# Patient Record
Sex: FEMALE | Race: BLACK OR AFRICAN AMERICAN | NOT HISPANIC OR LATINO | URBAN - METROPOLITAN AREA
[De-identification: names, ages, dates, MRNs, and addresses within clinical notes are randomized per-mention and may not be internally consistent; named-entity substitution may affect disease eponyms.]

---

## 2023-06-07 LAB
ABO GROUP (TYPE) IN BLOOD: NORMAL
ALANINE AMINOTRANSFERASE (SGPT) (U/L) IN SER/PLAS: 13 U/L (ref 7–45)
ALBUMIN (G/DL) IN SER/PLAS: 4.5 G/DL (ref 3.4–5)
ALKALINE PHOSPHATASE (U/L) IN SER/PLAS: 42 U/L (ref 33–110)
ANION GAP IN SER/PLAS: 10 MMOL/L (ref 10–20)
ANTIBODY SCREEN: NORMAL
ASPARTATE AMINOTRANSFERASE (SGOT) (U/L) IN SER/PLAS: 14 U/L (ref 9–39)
BILIRUBIN TOTAL (MG/DL) IN SER/PLAS: 0.4 MG/DL (ref 0–1.2)
CALCIUM (MG/DL) IN SER/PLAS: 10 MG/DL (ref 8.6–10.6)
CARBON DIOXIDE, TOTAL (MMOL/L) IN SER/PLAS: 30 MMOL/L (ref 21–32)
CHLORIDE (MMOL/L) IN SER/PLAS: 102 MMOL/L (ref 98–107)
CREATININE (MG/DL) IN SER/PLAS: 0.78 MG/DL (ref 0.5–1.05)
ERYTHROCYTE DISTRIBUTION WIDTH (RATIO) BY AUTOMATED COUNT: 11.9 % (ref 11.5–14.5)
ERYTHROCYTE MEAN CORPUSCULAR HEMOGLOBIN CONCENTRATION (G/DL) BY AUTOMATED: 33.6 G/DL (ref 32–36)
ERYTHROCYTE MEAN CORPUSCULAR VOLUME (FL) BY AUTOMATED COUNT: 96 FL (ref 80–100)
ERYTHROCYTES (10*6/UL) IN BLOOD BY AUTOMATED COUNT: 4.07 X10E12/L (ref 4–5.2)
GFR FEMALE: >90 ML/MIN/1.73M2
GLUCOSE (MG/DL) IN SER/PLAS: 72 MG/DL (ref 74–99)
HEMATOCRIT (%) IN BLOOD BY AUTOMATED COUNT: 39 % (ref 36–46)
HEMOGLOBIN (G/DL) IN BLOOD: 13.1 G/DL (ref 12–16)
HEPATITIS B VIRUS SURFACE AG PRESENCE IN SERUM: NONREACTIVE
HEPATITIS C VIRUS AB PRESENCE IN SERUM: NONREACTIVE
HIV 1/ 2 AG/AB SCREEN: NONREACTIVE
LACTATE DEHYDROGENASE (U/L) IN SER/PLAS BY LAC->PYR RXN: 121 U/L (ref 84–246)
LEUKOCYTES (10*3/UL) IN BLOOD BY AUTOMATED COUNT: 6.6 X10E9/L (ref 4.4–11.3)
NRBC (PER 100 WBCS) BY AUTOMATED COUNT: 0 /100 WBC (ref 0–0)
PLATELETS (10*3/UL) IN BLOOD AUTOMATED COUNT: 334 X10E9/L (ref 150–450)
POTASSIUM (MMOL/L) IN SER/PLAS: 4.1 MMOL/L (ref 3.5–5.3)
PROTEIN TOTAL: 7.5 G/DL (ref 6.4–8.2)
REFLEX ADDED, ANEMIA PANEL: NORMAL
RH FACTOR: NORMAL
RUBELLA VIRUS IGG AB: POSITIVE
SODIUM (MMOL/L) IN SER/PLAS: 138 MMOL/L (ref 136–145)
SYPHILIS TOTAL AB: NONREACTIVE
URATE (MG/DL) IN SER/PLAS: 3.8 MG/DL (ref 2.3–6.7)
UREA NITROGEN (MG/DL) IN SER/PLAS: 11 MG/DL (ref 6–23)

## 2023-06-08 LAB
CLUE CELLS: PRESENT
CREATININE (MG/DL) IN URINE: 180 MG/DL (ref 20–320)
HEMOGLOBIN A2: 3 %
HEMOGLOBIN A: 96.3 %
HEMOGLOBIN F: 0.7 %
HEMOGLOBIN IDENTIFICATION INTERPRETATION: NORMAL
NUGENT SCORE: 7
PATH REVIEW-HGB IDENTIFICATION: NORMAL
PROTEIN (MG/DL) IN URINE: 9 MG/DL (ref 5–24)
PROTEIN/CREATININE (MG/MG) IN URINE: 0.05 MG/MG CREAT (ref 0–0.17)
URINE CULTURE: NORMAL
YEAST: PRESENT

## 2023-06-09 LAB
CHLAMYDIA TRACH., AMPLIFIED: NEGATIVE
N. GONORRHEA, AMPLIFIED: NEGATIVE
TRICHOMONAS VAGINALIS: NEGATIVE

## 2023-09-30 PROBLEM — N76.0 BACTERIAL VAGINOSIS: Status: ACTIVE | Noted: 2023-09-30

## 2023-09-30 PROBLEM — O03.9 MISCARRIAGE (HHS-HCC): Status: ACTIVE | Noted: 2023-09-30

## 2023-09-30 PROBLEM — N88.8 FRIABLE CERVIX: Status: ACTIVE | Noted: 2023-09-30

## 2023-09-30 PROBLEM — O02.1 MISSED ABORTION (HHS-HCC): Status: ACTIVE | Noted: 2023-09-30

## 2023-09-30 PROBLEM — B96.89 BACTERIAL VAGINOSIS: Status: ACTIVE | Noted: 2023-09-30

## 2023-09-30 PROBLEM — B37.31 VAGINAL YEAST INFECTION: Status: ACTIVE | Noted: 2023-09-30

## 2023-09-30 PROBLEM — Z87.59 HISTORY OF PLACENTA ABRUPTION: Status: ACTIVE | Noted: 2023-09-30

## 2023-09-30 PROBLEM — N89.8 VAGINAL DISCHARGE: Status: ACTIVE | Noted: 2023-09-30

## 2023-09-30 RX ORDER — IBUPROFEN 800 MG/1
800 TABLET ORAL SEE ADMIN INSTRUCTIONS
COMMUNITY
End: 2023-10-08 | Stop reason: WASHOUT

## 2023-09-30 RX ORDER — BENZONATATE 200 MG/1
1 CAPSULE ORAL DAILY PRN
COMMUNITY
Start: 2017-12-27 | End: 2023-10-09 | Stop reason: WASHOUT

## 2023-09-30 RX ORDER — DICYCLOMINE HYDROCHLORIDE 20 MG/1
1 TABLET ORAL EVERY 8 HOURS PRN
COMMUNITY
Start: 2017-12-27 | End: 2023-10-09 | Stop reason: WASHOUT

## 2023-09-30 RX ORDER — LEVONORGESTREL 52 MG/1
INTRAUTERINE DEVICE INTRAUTERINE
COMMUNITY
End: 2023-10-09 | Stop reason: WASHOUT

## 2023-09-30 RX ORDER — ALUMINUM HYDROXIDE, MAGNESIUM HYDROXIDE, AND SIMETHICONE 2400; 240; 2400 MG/30ML; MG/30ML; MG/30ML
5 SUSPENSION ORAL 3 TIMES DAILY PRN
COMMUNITY
Start: 2017-12-27 | End: 2023-10-09 | Stop reason: WASHOUT

## 2023-09-30 RX ORDER — MISOPROSTOL 100 UG/1
TABLET ORAL SEE ADMIN INSTRUCTIONS
COMMUNITY
End: 2023-10-09 | Stop reason: WASHOUT

## 2023-09-30 RX ORDER — ASPIRIN 81 MG/1
2 TABLET ORAL DAILY
COMMUNITY
End: 2023-11-27 | Stop reason: SDUPTHER

## 2023-09-30 RX ORDER — ONDANSETRON 4 MG/1
1 TABLET, FILM COATED ORAL EVERY 6 HOURS PRN
COMMUNITY
Start: 2017-12-27 | End: 2023-10-09 | Stop reason: WASHOUT

## 2023-09-30 RX ORDER — ONDANSETRON 4 MG/1
1 TABLET, ORALLY DISINTEGRATING ORAL 3 TIMES DAILY PRN
Status: ON HOLD | COMMUNITY
Start: 2014-06-23 | End: 2024-01-31 | Stop reason: ALTCHOICE

## 2023-09-30 RX ORDER — TRAMADOL HYDROCHLORIDE 50 MG/1
1 TABLET ORAL EVERY 6 HOURS PRN
COMMUNITY
End: 2023-10-09 | Stop reason: WASHOUT

## 2023-10-03 ENCOUNTER — APPOINTMENT (OUTPATIENT)
Dept: OBSTETRICS AND GYNECOLOGY | Facility: CLINIC | Age: 38
End: 2023-10-03

## 2023-10-09 ENCOUNTER — INITIAL PRENATAL (OUTPATIENT)
Dept: OBSTETRICS AND GYNECOLOGY | Facility: CLINIC | Age: 38
End: 2023-10-09
Payer: MEDICAID

## 2023-10-09 VITALS — DIASTOLIC BLOOD PRESSURE: 84 MMHG | WEIGHT: 203 LBS | BODY MASS INDEX: 31.33 KG/M2 | SYSTOLIC BLOOD PRESSURE: 116 MMHG

## 2023-10-09 DIAGNOSIS — O99.210 OTHER OBESITY AFFECTING PREGNANCY, ANTEPARTUM (HHS-HCC): ICD-10-CM

## 2023-10-09 DIAGNOSIS — O09.899 HISTORY OF PRETERM DELIVERY, CURRENTLY PREGNANT (HHS-HCC): ICD-10-CM

## 2023-10-09 DIAGNOSIS — O99.611 CONSTIPATION DURING PREGNANCY IN FIRST TRIMESTER (HHS-HCC): ICD-10-CM

## 2023-10-09 DIAGNOSIS — E66.8 OTHER OBESITY AFFECTING PREGNANCY, ANTEPARTUM (HHS-HCC): ICD-10-CM

## 2023-10-09 DIAGNOSIS — Z87.59 HISTORY OF PRE-ECLAMPSIA: ICD-10-CM

## 2023-10-09 DIAGNOSIS — O09.529 ANTEPARTUM MULTIGRAVIDA OF ADVANCED MATERNAL AGE (HHS-HCC): ICD-10-CM

## 2023-10-09 DIAGNOSIS — Z34.81 ENCOUNTER FOR SUPERVISION OF NORMAL PREGNANCY IN MULTIGRAVIDA IN FIRST TRIMESTER (HHS-HCC): Primary | ICD-10-CM

## 2023-10-09 DIAGNOSIS — K59.00 CONSTIPATION DURING PREGNANCY IN FIRST TRIMESTER (HHS-HCC): ICD-10-CM

## 2023-10-09 DIAGNOSIS — O26.899 PREGNANCY HEADACHE, ANTEPARTUM (HHS-HCC): ICD-10-CM

## 2023-10-09 DIAGNOSIS — R51.9 PREGNANCY HEADACHE, ANTEPARTUM (HHS-HCC): ICD-10-CM

## 2023-10-09 PROCEDURE — 87661 TRICHOMONAS VAGINALIS AMPLIF: CPT

## 2023-10-09 PROCEDURE — 99214 OFFICE O/P EST MOD 30 MIN: CPT | Performed by: NURSE PRACTITIONER

## 2023-10-09 PROCEDURE — 82570 ASSAY OF URINE CREATININE: CPT

## 2023-10-09 PROCEDURE — 87800 DETECT AGNT MULT DNA DIREC: CPT

## 2023-10-09 PROCEDURE — 84156 ASSAY OF PROTEIN URINE: CPT

## 2023-10-09 PROCEDURE — 87086 URINE CULTURE/COLONY COUNT: CPT

## 2023-10-09 RX ORDER — METOCLOPRAMIDE 10 MG/1
10 TABLET ORAL EVERY 6 HOURS
COMMUNITY
Start: 2023-09-21 | End: 2023-10-09 | Stop reason: SDUPTHER

## 2023-10-09 RX ORDER — DOCUSATE SODIUM 100 MG/1
100 CAPSULE, LIQUID FILLED ORAL 2 TIMES DAILY PRN
Qty: 30 CAPSULE | Refills: 4 | Status: SHIPPED | OUTPATIENT
Start: 2023-10-09 | End: 2023-11-27 | Stop reason: SDUPTHER

## 2023-10-09 RX ORDER — METOCLOPRAMIDE 10 MG/1
10 TABLET ORAL EVERY 6 HOURS
Qty: 30 TABLET | Refills: 1 | Status: SHIPPED | OUTPATIENT
Start: 2023-10-09 | End: 2024-03-29 | Stop reason: HOSPADM

## 2023-10-09 SDOH — ECONOMIC STABILITY: FOOD INSECURITY: WITHIN THE PAST 12 MONTHS, THE FOOD YOU BOUGHT JUST DIDN'T LAST AND YOU DIDN'T HAVE MONEY TO GET MORE.: NEVER TRUE

## 2023-10-09 SDOH — ECONOMIC STABILITY: FOOD INSECURITY: WITHIN THE PAST 12 MONTHS, YOU WORRIED THAT YOUR FOOD WOULD RUN OUT BEFORE YOU GOT MONEY TO BUY MORE.: NEVER TRUE

## 2023-10-09 ASSESSMENT — SOCIAL DETERMINANTS OF HEALTH (SDOH)
WITHIN THE LAST YEAR, HAVE YOU BEEN HUMILIATED OR EMOTIONALLY ABUSED IN OTHER WAYS BY YOUR PARTNER OR EX-PARTNER?: NO
WITHIN THE LAST YEAR, HAVE YOU BEEN AFRAID OF YOUR PARTNER OR EX-PARTNER?: NO
WITHIN THE LAST YEAR, HAVE TO BEEN RAPED OR FORCED TO HAVE ANY KIND OF SEXUAL ACTIVITY BY YOUR PARTNER OR EX-PARTNER?: NO
WITHIN THE LAST YEAR, HAVE YOU BEEN KICKED, HIT, SLAPPED, OR OTHERWISE PHYSICALLY HURT BY YOUR PARTNER OR EX-PARTNER?: NO

## 2023-10-09 NOTE — PROGRESS NOTES
Assessment   Diagnoses and all orders for this visit:  Encounter for supervision of normal pregnancy in multigravida in first trimester  -     C. Trachomatis / N. Gonorrhoeae, Amplified Detection; Future  -     CBC Anemia Panel With Reflex,Pregnancy; Future  -     Hepatitis B Surface Antigen; Future  -     Hepatitis C Antibody; Future  -     Hepatitis C RNA, Quantitative, PCR; Future  -     HIV 1/2 Antigen/Antibody Screen with Reflex to Confirmation; Future  -     US MAC OB imaging order; Future  -     Urine Culture; Future  -     Type And Screen; Future  -     TRICH VAGINALIS, AMPLIFIED; Future  -     Syphilis Screen with Reflex; Future  -     Rubella Antibody, IgG; Future  -     Referral to Prenatal Genetics; Future  -     Referral to Maternal Fetal Medicine; Future  -     Hemoglobin A1C; Future  History of  delivery, currently pregnant  -     Referral to Maternal Fetal Medicine; Future  Antepartum multigravida of advanced maternal age  -     Referral to Prenatal Genetics; Future  Constipation during pregnancy in first trimester  -     docusate sodium (Colace) 100 mg capsule; Take 1 capsule (100 mg) by mouth 2 times a day as needed for constipation.  Pregnancy headache, antepartum  -     metoclopramide (Reglan) 10 mg tablet; Take 1 tablet (10 mg) by mouth every 6 hours.  Other obesity affecting pregnancy, antepartum  -     Hemoglobin A1C; Future  History of pre-eclampsia  -     Protein, Urine Random; Future  -     Comprehensive Metabolic Panel; Future      Plan   NOB plan: New OB resources provided and reviewed with particular attention to dietary, travel, and medication restrictions  Oriented to practice, CNM vs. MD care  Reviewed bleeding precautions, warning signs, when to call provider; phone number provided  The following Rx were sent to pharmacy: PNV, reglan, colace  Routine NOB labs ordered  Additional labs added: HgbA1C, CMP, P:C  Discussed Centering Pregnancy with patient, patient interested; will  enroll into group starting   Dating ultrasound ordered  MFM consult placed for h/o PTB  Genetics consult placed for AMA  Return in 4 weeks for routine prenatal care    Alexa Gutierrez, APRN-CNM, APRN-CNP    Subjective   Fiorella Leon is a 38 y.o.  at 25w1d with a working estimated date of delivery of 2024, by 8 week US who presents for an initial prenatal visit. Patient with recent miscarriage in , reports anxiety about viability of this pregnancy. Unsure on LMP, has not had period since miscarriage.     Patient currently experiencing: nausea/vomiting (taking Zofran), headaches, constipation  Bleeding or cramping since LMP: no  Taking prenatal vitamin: Yes  Ultrasound completed this pregnancy: Yes - done in ED on 23, ~ 8 weeks at that time    OB History    Para Term  AB Living   5 2   2 2     SAB IAB Ectopic Multiple Live Births   2       2      # Outcome Date GA Lbr Jayce/2nd Weight Sex Delivery Anes PTL Lv   5 Current            4 SAB 2023 8w0d          3   30w0d   M Vag-Spont  Y    2   36w0d  2948 g M Vag-Spont EPI N       Complications: Preeclampsia   1 SAB  16w0d   F          Complications: Abruptio Placenta        Prior pregnancy complications: pre-eclampsia,  birth at 30 weeks    History of hypertension:  Yes, preeclampsia    Past Medical History:   Diagnosis Date    Bacterial vaginosis 2023    History of placenta abruption 2023    Missed  2023      Past Surgical History:   Procedure Laterality Date    MOUTH SURGERY  2017    Oral Surgery Tooth Extraction      Social History     Socioeconomic History    Marital status: Single     Spouse name: Mckay Wiggins    Number of children: 2    Years of education: high school graduate    Highest education level: High school graduate   Occupational History    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   Tobacco Use    Smoking status:  Never    Smokeless tobacco: Never   Vaping Use    Vaping Use: Never used   Substance and Sexual Activity    Alcohol use: Never    Drug use: Never    Sexual activity: Yes   Other Topics Concern    None   Social History Narrative    None     Social Determinants of Health     Food Insecurity: No Food Insecurity (10/9/2023)    Hunger Vital Sign     Worried About Running Out of Food in the Last Year: Never true     Ran Out of Food in the Last Year: Never true   Intimate Partner Violence: Not At Risk (10/9/2023)    Humiliation, Afraid, Rape, and Kick questionnaire     Fear of Current or Ex-Partner: No     Emotionally Abused: No     Physically Abused: No     Sexually Abused: No        Objective   Physical Exam  Weight: 92.1 kg (203 lb)  TW kg ()   Expected Total Weight Gain: 5 kg (11 lb)-9 kg (19 lb)   Pregravid BMI: 31.31  BP: 116/84    Physical Exam  Constitutional:       Appearance: Normal appearance.   Genitourinary:      Genitourinary Comments: Deferred, not due for pap; no vaginal concerns   Cardiovascular:      Rate and Rhythm: Normal rate and regular rhythm.   Pulmonary:      Effort: Pulmonary effort is normal.      Breath sounds: Normal breath sounds.   Abdominal:      Palpations: Abdomen is soft.      Tenderness: There is no abdominal tenderness.   Neurological:      General: No focal deficit present.      Mental Status: She is alert and oriented to person, place, and time. Mental status is at baseline.   Skin:     General: Skin is warm and dry.   Psychiatric:         Mood and Affect: Mood normal.         Behavior: Behavior normal.         Thought Content: Thought content normal.         Judgment: Judgment normal.       Pregnancy Problems (from 10/09/23 to present)       Problem Noted Resolved    History of pre-eclampsia 10/10/2023 by RAYNE Gaston, SUN-CNP No    Priority:  Medium      Overview Signed 10/10/2023 10:41 PM by RAYNE Gaston, SUN-CNP     PEC in first pregnancy  -  baseline HELLP labs pending  - Baseline P:C = 0.08  - bASA to start after 12 weeks         History of  delivery, currently pregnant 10/10/2023 by RAYNE Gaston APRN-CNP No    Priority:  Medium      Pregnancy headache, antepartum 10/10/2023 by RAYNE Gaston APRN-CNP No    Priority:  Low      Constipation during pregnancy in first trimester 10/10/2023 by RAYNE Gaston APRN-CNP No    Priority:  Low      Antepartum multigravida of advanced maternal age 10/10/2023 by RAYNE Gaston APRN-CNP No    Overview Signed 10/10/2023 10:42 PM by RAYNE Gaston APRN-CNP     Genetics referral placed         Obesity complicating pregnancy, childbirth, or puerperium, antepartum 10/10/2023 by RAYNE Gaston APRN-CNP No    Overview Signed 10/10/2023 10:43 PM by RAYNE Gaston APRN-CNP     BMI=31 at NOB

## 2023-10-10 PROBLEM — R51.9 PREGNANCY HEADACHE, ANTEPARTUM (HHS-HCC): Status: ACTIVE | Noted: 2023-10-10

## 2023-10-10 PROBLEM — O09.529 ANTEPARTUM MULTIGRAVIDA OF ADVANCED MATERNAL AGE (HHS-HCC): Status: ACTIVE | Noted: 2023-10-10

## 2023-10-10 PROBLEM — N76.0 BACTERIAL VAGINOSIS: Status: RESOLVED | Noted: 2023-09-30 | Resolved: 2023-10-10

## 2023-10-10 PROBLEM — K59.00 CONSTIPATION DURING PREGNANCY IN FIRST TRIMESTER (HHS-HCC): Status: ACTIVE | Noted: 2023-10-10

## 2023-10-10 PROBLEM — O02.1 MISSED ABORTION (HHS-HCC): Status: RESOLVED | Noted: 2023-09-30 | Resolved: 2023-10-10

## 2023-10-10 PROBLEM — O99.210 OBESITY COMPLICATING PREGNANCY, CHILDBIRTH, OR PUERPERIUM, ANTEPARTUM (HHS-HCC): Status: ACTIVE | Noted: 2023-10-10

## 2023-10-10 PROBLEM — O99.611 CONSTIPATION DURING PREGNANCY IN FIRST TRIMESTER (HHS-HCC): Status: ACTIVE | Noted: 2023-10-10

## 2023-10-10 PROBLEM — O26.899 PREGNANCY HEADACHE, ANTEPARTUM (HHS-HCC): Status: ACTIVE | Noted: 2023-10-10

## 2023-10-10 PROBLEM — Z87.59 HISTORY OF PLACENTA ABRUPTION: Status: RESOLVED | Noted: 2023-09-30 | Resolved: 2023-10-10

## 2023-10-10 PROBLEM — Z87.59 HISTORY OF PRE-ECLAMPSIA: Status: ACTIVE | Noted: 2023-10-10

## 2023-10-10 PROBLEM — N89.8 VAGINAL DISCHARGE: Status: RESOLVED | Noted: 2023-09-30 | Resolved: 2023-10-10

## 2023-10-10 PROBLEM — B96.89 BACTERIAL VAGINOSIS: Status: RESOLVED | Noted: 2023-09-30 | Resolved: 2023-10-10

## 2023-10-10 PROBLEM — O09.899 HISTORY OF PRETERM DELIVERY, CURRENTLY PREGNANT (HHS-HCC): Status: ACTIVE | Noted: 2023-10-10

## 2023-10-10 LAB
C TRACH RRNA SPEC QL NAA+PROBE: NEGATIVE
CREAT UR-MCNC: 246.4 MG/DL (ref 20–320)
N GONORRHOEA DNA SPEC QL PROBE+SIG AMP: NEGATIVE
PROT UR-ACNC: 20 MG/DL (ref 5–24)
PROT/CREAT UR: 0.08 MG/MG CREAT (ref 0–0.17)
T VAGINALIS RRNA SPEC QL NAA+PROBE: NEGATIVE

## 2023-10-11 LAB — BACTERIA UR CULT: NO GROWTH

## 2023-10-28 NOTE — PROGRESS NOTES
Subjective   Patient ID [id number]   Fiorella Leon is a 38 y.o.  at 12w5d.  She had no complaints at the time of her visit today. Seen in consultation for history of  birth and pre-eclampsia. She denies any significant past medical history. She has initiated aspirin prophylaxis.     AMA: NT today. Reviewed options including diagnostic testing and desires cfDNA, is aware of limitations.     BMI 32: HgA1c ordered    Past obstetric history (primary records not available):    : 16 weeks loss in the context of sudden pain, delivered en caul, was told at the time abruption was suspected.     : Both pre-eclampsia and  labor at 36 weeks. Reports labor was spontaneous and not induced for pre-e. Had longer PP course for BP control. Blood pressures returned to normal PP, does not carry Dx of CHTN. Did not have pre-e in next pregnancy.     : PPROM at 30 weeks. Child A+W. Did not have pre-eclampsia.      Objective     Visit Vitals  /73              Prenatal Labs  Urine dip:      Lab Results   Component Value Date    HGB 12.7 2023    HCT 36.3 2023    ABO A 2023    HEPBSAG NONREACTIVE 2023       Imaging  NT ultrasound today, scheduled for 16 week cervical length.     Assessment/Plan   Problem List Items Addressed This Visit             ICD-10-CM       Pregnancy    Antepartum multigravida of advanced maternal age O09.529     NT WNL  cfDNA obtained         History of pre-eclampsia - Primary Z87.59     No history of CHTN  Baseline pre-eclampsia labs ordered  Aspirin prophylaxis         History of  delivery, currently pregnant O09.899     Counseled regarding recurrence risk and symptom precaution  Cervical length monitoring starting at 16 weeks         Relevant Orders    [facility] Prenatal Screen     Other Visit Diagnoses         Codes    Encounter for supervision of normal pregnancy in multigravida in first trimester     Z34.81          Is aware of significant  PTB recurrence risk given history. Reviewed potential for cervical cerclage if decreased cervical length is encountered, cervical length monitoring starting at 16 weeks. Baseline pre-eclampsia labs.     Will see for follow up consultation at time of cervical length. If labs okay and cervical lengths reassuring may be able to transfer back though will follow with MFM at least through cervical length monitoring given history.

## 2023-10-30 ENCOUNTER — ANCILLARY PROCEDURE (OUTPATIENT)
Dept: RADIOLOGY | Facility: CLINIC | Age: 38
End: 2023-10-30
Payer: MEDICAID

## 2023-10-30 ENCOUNTER — LAB (OUTPATIENT)
Dept: LAB | Facility: LAB | Age: 38
End: 2023-10-30
Payer: MEDICAID

## 2023-10-30 ENCOUNTER — INITIAL PRENATAL (OUTPATIENT)
Dept: MATERNAL FETAL MEDICINE | Facility: CLINIC | Age: 38
End: 2023-10-30
Payer: MEDICAID

## 2023-10-30 VITALS — BODY MASS INDEX: 32.41 KG/M2 | SYSTOLIC BLOOD PRESSURE: 110 MMHG | WEIGHT: 210 LBS | DIASTOLIC BLOOD PRESSURE: 73 MMHG

## 2023-10-30 DIAGNOSIS — E66.8 OTHER OBESITY AFFECTING PREGNANCY, ANTEPARTUM (HHS-HCC): ICD-10-CM

## 2023-10-30 DIAGNOSIS — O09.899 HISTORY OF PRETERM DELIVERY, CURRENTLY PREGNANT (HHS-HCC): ICD-10-CM

## 2023-10-30 DIAGNOSIS — Z34.81 ENCOUNTER FOR SUPERVISION OF NORMAL PREGNANCY IN MULTIGRAVIDA IN FIRST TRIMESTER (HHS-HCC): ICD-10-CM

## 2023-10-30 DIAGNOSIS — O99.210 OBESITY AFFECTING PREGNANCY (HHS-HCC): ICD-10-CM

## 2023-10-30 DIAGNOSIS — Z87.59 HISTORY OF PRE-ECLAMPSIA: ICD-10-CM

## 2023-10-30 DIAGNOSIS — O09.529 ANTEPARTUM MULTIGRAVIDA OF ADVANCED MATERNAL AGE (HHS-HCC): ICD-10-CM

## 2023-10-30 DIAGNOSIS — Z87.59 HISTORY OF PRE-ECLAMPSIA: Primary | ICD-10-CM

## 2023-10-30 DIAGNOSIS — O99.210 OTHER OBESITY AFFECTING PREGNANCY, ANTEPARTUM (HHS-HCC): ICD-10-CM

## 2023-10-30 LAB
ABO GROUP (TYPE) IN BLOOD: NORMAL
ALBUMIN SERPL BCP-MCNC: 3.9 G/DL (ref 3.4–5)
ALP SERPL-CCNC: 41 U/L (ref 33–110)
ALT SERPL W P-5'-P-CCNC: 13 U/L (ref 7–45)
ANION GAP SERPL CALC-SCNC: 13 MMOL/L (ref 10–20)
ANTIBODY SCREEN: NORMAL
AST SERPL W P-5'-P-CCNC: 12 U/L (ref 9–39)
BILIRUB SERPL-MCNC: 0.3 MG/DL (ref 0–1.2)
BUN SERPL-MCNC: 9 MG/DL (ref 6–23)
CALCIUM SERPL-MCNC: 8.9 MG/DL (ref 8.6–10.3)
CHLORIDE SERPL-SCNC: 103 MMOL/L (ref 98–107)
CO2 SERPL-SCNC: 22 MMOL/L (ref 21–32)
CREAT SERPL-MCNC: 0.6 MG/DL (ref 0.5–1.05)
ERYTHROCYTE [DISTWIDTH] IN BLOOD BY AUTOMATED COUNT: 12 % (ref 11.5–14.5)
EST. AVERAGE GLUCOSE BLD GHB EST-MCNC: 103 MG/DL
GFR SERPL CREATININE-BSD FRML MDRD: >90 ML/MIN/1.73M*2
GLUCOSE SERPL-MCNC: 84 MG/DL (ref 74–99)
HBA1C MFR BLD: 5.2 %
HBV SURFACE AG SERPL QL IA: NONREACTIVE
HCT VFR BLD AUTO: 33.4 % (ref 36–46)
HCV AB SER QL: NONREACTIVE
HGB BLD-MCNC: 11.5 G/DL (ref 12–16)
HIV 1+2 AB+HIV1 P24 AG SERPL QL IA: NONREACTIVE
MCH RBC QN AUTO: 32.1 PG (ref 26–34)
MCHC RBC AUTO-ENTMCNC: 34.4 G/DL (ref 32–36)
MCV RBC AUTO: 93 FL (ref 80–100)
NRBC BLD-RTO: 0 /100 WBCS (ref 0–0)
PLATELET # BLD AUTO: 284 X10*3/UL (ref 150–450)
PMV BLD AUTO: 9.5 FL (ref 7.5–11.5)
POTASSIUM SERPL-SCNC: 4 MMOL/L (ref 3.5–5.3)
PROT SERPL-MCNC: 6.9 G/DL (ref 6.4–8.2)
RBC # BLD AUTO: 3.58 X10*6/UL (ref 4–5.2)
REFLEX ADDED, ANEMIA PANEL: NORMAL
RH FACTOR (ANTIGEN D): NORMAL
RUBV IGG SERPL IA-ACNC: 1.9 IA
RUBV IGG SERPL QL IA: POSITIVE
SODIUM SERPL-SCNC: 134 MMOL/L (ref 136–145)
T PALLIDUM AB SER QL: NONREACTIVE
WBC # BLD AUTO: 6.5 X10*3/UL (ref 4.4–11.3)

## 2023-10-30 PROCEDURE — 99215 OFFICE O/P EST HI 40 MIN: CPT | Performed by: OBSTETRICS & GYNECOLOGY

## 2023-10-30 PROCEDURE — 76815 OB US LIMITED FETUS(S): CPT | Performed by: OBSTETRICS & GYNECOLOGY

## 2023-10-30 PROCEDURE — 76801 OB US < 14 WKS SINGLE FETUS: CPT

## 2023-10-30 PROCEDURE — 80053 COMPREHEN METABOLIC PANEL: CPT

## 2023-10-30 PROCEDURE — 86317 IMMUNOASSAY INFECTIOUS AGENT: CPT

## 2023-10-30 PROCEDURE — 83036 HEMOGLOBIN GLYCOSYLATED A1C: CPT

## 2023-10-30 PROCEDURE — 87522 HEPATITIS C REVRS TRNSCRPJ: CPT

## 2023-10-30 PROCEDURE — 86901 BLOOD TYPING SEROLOGIC RH(D): CPT

## 2023-10-30 PROCEDURE — 87340 HEPATITIS B SURFACE AG IA: CPT

## 2023-10-30 PROCEDURE — 86780 TREPONEMA PALLIDUM: CPT

## 2023-10-30 PROCEDURE — 86900 BLOOD TYPING SEROLOGIC ABO: CPT

## 2023-10-30 PROCEDURE — 76813 OB US NUCHAL MEAS 1 GEST: CPT

## 2023-10-30 PROCEDURE — 76813 OB US NUCHAL MEAS 1 GEST: CPT | Performed by: OBSTETRICS & GYNECOLOGY

## 2023-10-30 PROCEDURE — 87389 HIV-1 AG W/HIV-1&-2 AB AG IA: CPT

## 2023-10-30 PROCEDURE — 99215 OFFICE O/P EST HI 40 MIN: CPT | Mod: 25 | Performed by: OBSTETRICS & GYNECOLOGY

## 2023-10-30 PROCEDURE — 85027 COMPLETE CBC AUTOMATED: CPT

## 2023-10-30 PROCEDURE — 86803 HEPATITIS C AB TEST: CPT

## 2023-10-30 PROCEDURE — 86850 RBC ANTIBODY SCREEN: CPT

## 2023-10-30 PROCEDURE — 36415 COLL VENOUS BLD VENIPUNCTURE: CPT

## 2023-10-30 NOTE — ASSESSMENT & PLAN NOTE
Counseled regarding recurrence risk and symptom precaution  Cervical length monitoring starting at 16 weeks

## 2023-10-31 LAB
HCV RNA SERPL NAA+PROBE-ACNC: NOT DETECTED K[IU]/ML
HCV RNA SERPL NAA+PROBE-LOG IU: NORMAL {LOG_IU}/ML

## 2023-11-13 PROBLEM — O09.522: Status: ACTIVE | Noted: 2023-11-13

## 2023-11-14 ENCOUNTER — APPOINTMENT (OUTPATIENT)
Dept: MATERNAL FETAL MEDICINE | Facility: CLINIC | Age: 38
End: 2023-11-14
Payer: MEDICAID

## 2023-11-14 ENCOUNTER — ROUTINE PRENATAL (OUTPATIENT)
Dept: MATERNAL FETAL MEDICINE | Facility: CLINIC | Age: 38
End: 2023-11-14
Payer: MEDICAID

## 2023-11-14 ENCOUNTER — ANCILLARY PROCEDURE (OUTPATIENT)
Dept: RADIOLOGY | Facility: CLINIC | Age: 38
End: 2023-11-14
Payer: MEDICAID

## 2023-11-14 VITALS — WEIGHT: 215.4 LBS | DIASTOLIC BLOOD PRESSURE: 73 MMHG | BODY MASS INDEX: 33.24 KG/M2 | SYSTOLIC BLOOD PRESSURE: 112 MMHG

## 2023-11-14 DIAGNOSIS — O99.210 OBESITY AFFECTING PREGNANCY, ANTEPARTUM, UNSPECIFIED OBESITY TYPE (HHS-HCC): ICD-10-CM

## 2023-11-14 DIAGNOSIS — O99.611 CONSTIPATION DURING PREGNANCY IN FIRST TRIMESTER (HHS-HCC): ICD-10-CM

## 2023-11-14 DIAGNOSIS — O26.899 PREGNANCY HEADACHE, ANTEPARTUM (HHS-HCC): ICD-10-CM

## 2023-11-14 DIAGNOSIS — O09.899 HISTORY OF PRETERM DELIVERY, CURRENTLY PREGNANT (HHS-HCC): ICD-10-CM

## 2023-11-14 DIAGNOSIS — Z3A.16 16 WEEKS GESTATION OF PREGNANCY (HHS-HCC): ICD-10-CM

## 2023-11-14 DIAGNOSIS — O09.522 SUPERVISION OF HIGH RISK ELDERLY MULTIGRAVIDA IN SECOND TRIMESTER (HHS-HCC): ICD-10-CM

## 2023-11-14 DIAGNOSIS — O09.892 HISTORY OF PRETERM DELIVERY, CURRENTLY PREGNANT IN SECOND TRIMESTER (HHS-HCC): ICD-10-CM

## 2023-11-14 DIAGNOSIS — K59.00 CONSTIPATION DURING PREGNANCY IN FIRST TRIMESTER (HHS-HCC): ICD-10-CM

## 2023-11-14 DIAGNOSIS — O09.529 ANTEPARTUM MULTIGRAVIDA OF ADVANCED MATERNAL AGE (HHS-HCC): ICD-10-CM

## 2023-11-14 DIAGNOSIS — R51.9 PREGNANCY HEADACHE, ANTEPARTUM (HHS-HCC): ICD-10-CM

## 2023-11-14 DIAGNOSIS — Z87.59 HISTORY OF PRE-ECLAMPSIA: Primary | ICD-10-CM

## 2023-11-14 LAB
POC APPEARANCE, URINE: CLEAR
POC BILIRUBIN, URINE: NEGATIVE
POC BLOOD, URINE: NEGATIVE
POC COLOR, URINE: YELLOW
POC GLUCOSE, URINE: NEGATIVE MG/DL
POC KETONES, URINE: NEGATIVE MG/DL
POC LEUKOCYTES, URINE: NEGATIVE
POC NITRITE,URINE: NEGATIVE
POC PH, URINE: 6.5 PH
POC PROTEIN, URINE: NEGATIVE MG/DL
POC SPECIFIC GRAVITY, URINE: >=1.03
POC UROBILINOGEN, URINE: 0.2 EU/DL

## 2023-11-14 PROCEDURE — 99213 OFFICE O/P EST LOW 20 MIN: CPT | Mod: 25 | Performed by: NURSE PRACTITIONER

## 2023-11-14 PROCEDURE — 76815 OB US LIMITED FETUS(S): CPT | Performed by: OBSTETRICS & GYNECOLOGY

## 2023-11-14 PROCEDURE — 76817 TRANSVAGINAL US OBSTETRIC: CPT

## 2023-11-14 PROCEDURE — 76817 TRANSVAGINAL US OBSTETRIC: CPT | Performed by: OBSTETRICS & GYNECOLOGY

## 2023-11-14 PROCEDURE — 99213 OFFICE O/P EST LOW 20 MIN: CPT | Performed by: NURSE PRACTITIONER

## 2023-11-14 PROCEDURE — 81003 URINALYSIS AUTO W/O SCOPE: CPT | Performed by: NURSE PRACTITIONER

## 2023-11-14 PROCEDURE — 76815 OB US LIMITED FETUS(S): CPT

## 2023-11-14 NOTE — PROGRESS NOTES
Fiorella Leon is a 38 y.o. at 15w6d here for F/U prenatal visit with [facility]    Her pregnancy is complicated by:   H/O  delivery  H/O Pre-e    Overall she reports feeling well. Feeling flutters. Denies VB, LOF, or CTXs.     Concerns today: no concerns     Visit Vitals  /73   Wt 97.7 kg (215 lb 6.4 oz)   LMP  (LMP Unknown)   BMI 33.24 kg/m²   OB Status Pregnant   Smoking Status Never   BSA 2.16 m²      Gen-NAD  Cardiac- good peripheral perfusion  Respiratory- non-labored breathing  Abdomen- soft, non-tender  Extremities- symmetrical   Pelvic- deferred      Sono- Cervical length 34mm - will continue with q2 wk F/U scans    Problem List Items Addressed This Visit          Pregnancy    Antepartum multigravida of advanced maternal age    Overview     -s/p genetics  -cfDNA RR           History of pre-eclampsia - Primary    Overview     -PEC in first pregnancy  - baseline HELLP labs pending  - Baseline P:C = 0.08  - Continue bASA 162mg every day          History of  delivery, currently pregnant    Overview     -Counseled at initial [facility] visit re: recurrence risk and symptom precautions   -Plan for serial cervical lengths from 16wks to 22 wks             Obesity complicating pregnancy, childbirth, or puerperium, antepartum    Overview     BMI=31 at NOB           Supervision of high risk elderly multigravida in second trimester    Overview     -Serial cervical lengths planned   -Anatomy at 19-20 wks   -RR cfDNA  -PNV x 4 wks                MFM F/U x 4 wks   Continue serial cervical lengths q 2 wks       Kay Sabillon, APRN-CNP

## 2023-11-15 ENCOUNTER — APPOINTMENT (OUTPATIENT)
Dept: MATERNAL FETAL MEDICINE | Facility: CLINIC | Age: 38
End: 2023-11-15
Payer: MEDICAID

## 2023-11-15 ENCOUNTER — APPOINTMENT (OUTPATIENT)
Dept: RADIOLOGY | Facility: CLINIC | Age: 38
End: 2023-11-15
Payer: MEDICAID

## 2023-11-17 ENCOUNTER — TELEPHONE (OUTPATIENT)
Dept: OBSTETRICS AND GYNECOLOGY | Facility: CLINIC | Age: 38
End: 2023-11-17

## 2023-11-24 NOTE — PROGRESS NOTES
Subjective     Fiorella Leon is a 38 y.o.  at 17w5d with a working estimated date of delivery of 2024, by Ultrasound who presents for a routine prenatal visit.     She denies vaginal bleeding, leakage of fluid, or strong pelvic pain. Endorses FM.     Patient would like flu shot today.     Objective   Physical Exam  Weight: 100 kg (221 lb)  Expected Total Weight Gain: 5 kg (11 lb)-9 kg (19 lb)   Pregravid BMI: 31.31  BP: 111/73 (pluse-79)  Fetal Heart Rate: 150 Fundal Height (cm): 18 cm    Problem List Items Addressed This Visit       History of pre-eclampsia    Overview     - PEC in first pregnancy  - baseline HELLP labs WNL, baseline P:C = 0.08  - Continue bASA 162mg every day          Pregnancy headache, antepartum    Constipation during pregnancy in first trimester    Relevant Medications    docusate sodium (Colace) 100 mg capsule    Antepartum multigravida of advanced maternal age    Overview     -s/p genetics  -cfDNA RR           History of  delivery, currently pregnant    Overview       30 and 36w PTB  Counseled at initial [facility] visit re: recurrence risk and symptom precautions   Plan for serial cervical lengths from 16wks to 22 wks             Obesity complicating pregnancy, childbirth, or puerperium, antepartum    Overview     BMI=31 at NOB           Supervision of high risk elderly multigravida in second trimester    Overview     Serial cervical lengths planned   RR cfDNA  Boy, yes to circ          Other Visit Diagnoses       17 weeks gestation of pregnancy    -  Primary    Relevant Medications    aspirin 81 mg EC tablet    Influenza vaccine administered                Discussed flu vaccine, VIS handout provided, patient accepted  -cervical length US at 22w with anatomy   -Reviewed reasons to call CNM on-call: vaginal bleeding, loss of fluid, increased pelvic pain/contractions, or any questions/concerns  -RTC in 4 weeks or prn    SUN Magana-JAKY

## 2023-11-27 ENCOUNTER — ROUTINE PRENATAL (OUTPATIENT)
Dept: OBSTETRICS AND GYNECOLOGY | Facility: CLINIC | Age: 38
End: 2023-11-27
Payer: MEDICAID

## 2023-11-27 VITALS — DIASTOLIC BLOOD PRESSURE: 73 MMHG | SYSTOLIC BLOOD PRESSURE: 111 MMHG | WEIGHT: 221 LBS | BODY MASS INDEX: 34.1 KG/M2

## 2023-11-27 DIAGNOSIS — O09.522 SUPERVISION OF HIGH RISK ELDERLY MULTIGRAVIDA IN SECOND TRIMESTER (HHS-HCC): ICD-10-CM

## 2023-11-27 DIAGNOSIS — E66.01 SEVERE OBESITY DUE TO EXCESS CALORIES AFFECTING PREGNANCY, ANTEPARTUM (MULTI): ICD-10-CM

## 2023-11-27 DIAGNOSIS — Z23 INFLUENZA VACCINE ADMINISTERED: ICD-10-CM

## 2023-11-27 DIAGNOSIS — O09.899 HISTORY OF PRETERM DELIVERY, CURRENTLY PREGNANT (HHS-HCC): ICD-10-CM

## 2023-11-27 DIAGNOSIS — K59.00 CONSTIPATION DURING PREGNANCY IN FIRST TRIMESTER (HHS-HCC): ICD-10-CM

## 2023-11-27 DIAGNOSIS — Z3A.17 17 WEEKS GESTATION OF PREGNANCY (HHS-HCC): Primary | ICD-10-CM

## 2023-11-27 DIAGNOSIS — O99.210 SEVERE OBESITY DUE TO EXCESS CALORIES AFFECTING PREGNANCY, ANTEPARTUM (MULTI): ICD-10-CM

## 2023-11-27 DIAGNOSIS — O99.611 CONSTIPATION DURING PREGNANCY IN FIRST TRIMESTER (HHS-HCC): ICD-10-CM

## 2023-11-27 DIAGNOSIS — R51.9 PREGNANCY HEADACHE, ANTEPARTUM (HHS-HCC): ICD-10-CM

## 2023-11-27 DIAGNOSIS — O09.529 ANTEPARTUM MULTIGRAVIDA OF ADVANCED MATERNAL AGE (HHS-HCC): ICD-10-CM

## 2023-11-27 DIAGNOSIS — O26.899 PREGNANCY HEADACHE, ANTEPARTUM (HHS-HCC): ICD-10-CM

## 2023-11-27 DIAGNOSIS — Z87.59 HISTORY OF PRE-ECLAMPSIA: ICD-10-CM

## 2023-11-27 PROCEDURE — 99213 OFFICE O/P EST LOW 20 MIN: CPT | Performed by: ADVANCED PRACTICE MIDWIFE

## 2023-11-27 PROCEDURE — 99213 OFFICE O/P EST LOW 20 MIN: CPT | Mod: TH,25 | Performed by: ADVANCED PRACTICE MIDWIFE

## 2023-11-27 PROCEDURE — 90686 IIV4 VACC NO PRSV 0.5 ML IM: CPT | Performed by: ADVANCED PRACTICE MIDWIFE

## 2023-11-27 RX ORDER — DOCUSATE SODIUM 100 MG/1
100 CAPSULE, LIQUID FILLED ORAL 2 TIMES DAILY PRN
Qty: 30 CAPSULE | Refills: 4 | Status: SHIPPED | OUTPATIENT
Start: 2023-11-27 | End: 2024-03-29 | Stop reason: HOSPADM

## 2023-11-27 RX ORDER — ASPIRIN 81 MG/1
162 TABLET ORAL DAILY
Qty: 60 TABLET | Refills: 11 | Status: SHIPPED | OUTPATIENT
Start: 2023-11-27 | End: 2024-03-29 | Stop reason: HOSPADM

## 2023-12-01 ENCOUNTER — ANCILLARY PROCEDURE (OUTPATIENT)
Dept: RADIOLOGY | Facility: CLINIC | Age: 38
End: 2023-12-01
Payer: MEDICAID

## 2023-12-01 DIAGNOSIS — Z87.51 HISTORY OF PRETERM DELIVERY: ICD-10-CM

## 2023-12-01 DIAGNOSIS — Z3A.16 16 WEEKS GESTATION OF PREGNANCY (HHS-HCC): ICD-10-CM

## 2023-12-01 PROCEDURE — 76817 TRANSVAGINAL US OBSTETRIC: CPT

## 2023-12-01 PROCEDURE — 76815 OB US LIMITED FETUS(S): CPT

## 2023-12-01 PROCEDURE — 76815 OB US LIMITED FETUS(S): CPT | Performed by: OBSTETRICS & GYNECOLOGY

## 2023-12-01 PROCEDURE — 76817 TRANSVAGINAL US OBSTETRIC: CPT | Performed by: OBSTETRICS & GYNECOLOGY

## 2023-12-15 ENCOUNTER — ANCILLARY PROCEDURE (OUTPATIENT)
Dept: RADIOLOGY | Facility: CLINIC | Age: 38
End: 2023-12-15
Payer: COMMERCIAL

## 2023-12-15 ENCOUNTER — ROUTINE PRENATAL (OUTPATIENT)
Dept: MATERNAL FETAL MEDICINE | Facility: CLINIC | Age: 38
End: 2023-12-15
Payer: COMMERCIAL

## 2023-12-15 ENCOUNTER — ROUTINE PRENATAL (OUTPATIENT)
Dept: OBSTETRICS AND GYNECOLOGY | Facility: CLINIC | Age: 38
End: 2023-12-15
Payer: COMMERCIAL

## 2023-12-15 VITALS — WEIGHT: 222.8 LBS | SYSTOLIC BLOOD PRESSURE: 118 MMHG | BODY MASS INDEX: 34.38 KG/M2 | DIASTOLIC BLOOD PRESSURE: 74 MMHG

## 2023-12-15 VITALS — DIASTOLIC BLOOD PRESSURE: 81 MMHG | SYSTOLIC BLOOD PRESSURE: 119 MMHG | BODY MASS INDEX: 34.26 KG/M2 | WEIGHT: 222 LBS

## 2023-12-15 DIAGNOSIS — Z87.59 HISTORY OF PRE-ECLAMPSIA: ICD-10-CM

## 2023-12-15 DIAGNOSIS — O09.522 SUPERVISION OF HIGH RISK ELDERLY MULTIGRAVIDA IN SECOND TRIMESTER (HHS-HCC): Primary | ICD-10-CM

## 2023-12-15 DIAGNOSIS — O09.529 ANTEPARTUM MULTIGRAVIDA OF ADVANCED MATERNAL AGE (HHS-HCC): ICD-10-CM

## 2023-12-15 DIAGNOSIS — O09.899 HISTORY OF PRETERM DELIVERY, CURRENTLY PREGNANT (HHS-HCC): ICD-10-CM

## 2023-12-15 DIAGNOSIS — E66.01 SEVERE OBESITY DUE TO EXCESS CALORIES AFFECTING PREGNANCY, ANTEPARTUM (MULTI): ICD-10-CM

## 2023-12-15 DIAGNOSIS — Z3A.20 20 WEEKS GESTATION OF PREGNANCY (HHS-HCC): ICD-10-CM

## 2023-12-15 DIAGNOSIS — O99.210 SEVERE OBESITY DUE TO EXCESS CALORIES AFFECTING PREGNANCY, ANTEPARTUM (MULTI): ICD-10-CM

## 2023-12-15 DIAGNOSIS — R51.9 PREGNANCY HEADACHE, ANTEPARTUM (HHS-HCC): ICD-10-CM

## 2023-12-15 DIAGNOSIS — O09.522 SUPERVISION OF HIGH RISK ELDERLY MULTIGRAVIDA IN SECOND TRIMESTER (HHS-HCC): ICD-10-CM

## 2023-12-15 DIAGNOSIS — O99.611 CONSTIPATION DURING PREGNANCY IN FIRST TRIMESTER (HHS-HCC): ICD-10-CM

## 2023-12-15 DIAGNOSIS — Z3A.20 20 WEEKS GESTATION OF PREGNANCY (HHS-HCC): Primary | ICD-10-CM

## 2023-12-15 DIAGNOSIS — K59.00 CONSTIPATION DURING PREGNANCY IN FIRST TRIMESTER (HHS-HCC): ICD-10-CM

## 2023-12-15 DIAGNOSIS — Z3A.16 16 WEEKS GESTATION OF PREGNANCY (HHS-HCC): ICD-10-CM

## 2023-12-15 DIAGNOSIS — O26.899 PREGNANCY HEADACHE, ANTEPARTUM (HHS-HCC): ICD-10-CM

## 2023-12-15 PROCEDURE — 76817 TRANSVAGINAL US OBSTETRIC: CPT | Performed by: OBSTETRICS & GYNECOLOGY

## 2023-12-15 PROCEDURE — 99213 OFFICE O/P EST LOW 20 MIN: CPT | Performed by: NURSE PRACTITIONER

## 2023-12-15 PROCEDURE — 99213 OFFICE O/P EST LOW 20 MIN: CPT | Mod: 25,27 | Performed by: OBSTETRICS & GYNECOLOGY

## 2023-12-15 PROCEDURE — H1002 CARECOORDINATION PRENATAL: HCPCS | Performed by: NURSE PRACTITIONER

## 2023-12-15 PROCEDURE — 76811 OB US DETAILED SNGL FETUS: CPT | Performed by: OBSTETRICS & GYNECOLOGY

## 2023-12-15 PROCEDURE — 99213 OFFICE O/P EST LOW 20 MIN: CPT | Performed by: OBSTETRICS & GYNECOLOGY

## 2023-12-15 PROCEDURE — 76817 TRANSVAGINAL US OBSTETRIC: CPT

## 2023-12-15 PROCEDURE — 76811 OB US DETAILED SNGL FETUS: CPT

## 2023-12-15 ASSESSMENT — EDINBURGH POSTNATAL DEPRESSION SCALE (EPDS)
I HAVE BEEN SO UNHAPPY THAT I HAVE BEEN CRYING: NO, NEVER
I HAVE BEEN ANXIOUS OR WORRIED FOR NO GOOD REASON: YES, SOMETIMES
THINGS HAVE BEEN GETTING ON TOP OF ME: NO, MOST OF THE TIME I HAVE COPED QUITE WELL
I HAVE BEEN SO UNHAPPY THAT I HAVE HAD DIFFICULTY SLEEPING: NOT AT ALL
I HAVE LOOKED FORWARD WITH ENJOYMENT TO THINGS: AS MUCH AS I EVER DID
I HAVE BLAMED MYSELF UNNECESSARILY WHEN THINGS WENT WRONG: NOT VERY OFTEN
I HAVE FELT SCARED OR PANICKY FOR NO GOOD REASON: NO, NOT AT ALL
I HAVE BEEN ABLE TO LAUGH AND SEE THE FUNNY SIDE OF THINGS: AS MUCH AS I ALWAYS COULD
THE THOUGHT OF HARMING MYSELF HAS OCCURRED TO ME: NEVER
I HAVE FELT SAD OR MISERABLE: NO, NOT AT ALL
TOTAL SCORE: 4

## 2023-12-15 NOTE — PROGRESS NOTES
Assessment/Plan   Diagnoses and all orders for this visit:  Supervision of high risk elderly multigravida in second trimester  20 weeks gestation of pregnancy  History of  delivery, currently pregnant    Centering Session 3  The following educational material was reviewed:  Stressors, tension  Mental relaxation practice   labor signs/symptoms    Centering visit total time: 120    Anatomy US scheduled for today  Reviewed s/sx of PTL, warning signs, fetal movement counts, and when to call provider  Follow up in 4 weeks for a routine prenatal visit.    RAYNE Johnson, APRN-CNP    Tracy Leon is a 38 y.o.  at 20w2d with a working estimated date of delivery of 2024, by Ultrasound who presents for a routine prenatal visit. She denies vaginal bleeding, leakage of fluid, decreased fetal movements, or contractions.    Patient feeling well overall, denies concerns. Has MFM consult and anatomy US scheduled for today.    Her pregnancy is complicated by:  Pregnancy Problems (from 10/09/23 to present)       Problem Noted Resolved    Supervision of high risk elderly multigravida in second trimester 2023 by JOSE F Curtis No    Priority:  Medium      Overview Addendum 2023  4:19 PM by RAYNE Magana     Serial cervical lengths planned   RR cfDNA  Boy, yes to circ         History of pre-eclampsia 10/10/2023 by RAYNE Gaston APRN-CNP No    Priority:  Medium      Overview Addendum 2023  9:09 PM by RAYNE Magana     - PEC in first pregnancy  - baseline HELLP labs WNL, baseline P:C = 0.08  - Continue bASA 162mg every day          History of  delivery, currently pregnant 10/10/2023 by RAYNE Gaston APRN-CNP No    Priority:  Medium      Overview Addendum 2023  4:27 PM by RAYNE Magana       30 and 36w PTB  Counseled at initial MFM visit re: recurrence risk  and symptom precautions   Plan for serial cervical lengths from 16wks to 22 wks             Pregnancy headache, antepartum 10/10/2023 by RAYNE Gaston APRN-CNP No    Priority:  Low      Constipation during pregnancy in first trimester 10/10/2023 by RAYNE Gaston APRN-CNP No    Priority:  Low      Antepartum multigravida of advanced maternal age 10/10/2023 by RAYNE Gatson APRN-CNP No    Overview Addendum 2023 10:20 PM by JOSE F Curtis     -s/p genetics  -cfDNA RR           Obesity complicating pregnancy, childbirth, or puerperium, antepartum 10/10/2023 by RAYNE Gaston APRN-CNP No    Overview Addendum 2023 10:24 PM by JOSE F Curtis     BMI=31 at NOB            Objective   Physical Exam  Weight: 101 kg (222 lb)  TW.981 kg (19 lb 12.8 oz)  Expected Total Weight Gain: 5 kg (11 lb)-9 kg (19 lb)   Pregravid BMI: 31.31  BP: 119/81  Fetal Heart Rate: 153 Fundal Height (cm): 22 cm    Postpartum Depression: Not on file       Prenatal Labs  Lab Results   Component Value Date    HGB 11.5 (L) 10/30/2023    HCT 33.4 (L) 10/30/2023     10/30/2023    ABO A 10/30/2023    LABRH POS 10/30/2023    NEISSGONOAMP Negative 10/09/2023    CHLAMTRACAMP Negative 10/09/2023    SYPHT Nonreactive 10/30/2023    HEPBSAG Nonreactive 10/30/2023    HIV1X2 Nonreactive 10/30/2023    URINECULTURE No growth 10/09/2023       Imaging  The most recent ultrasound was performed on 23 with a study GA of 18.2wga, CL 36.1mm.

## 2023-12-16 NOTE — ASSESSMENT & PLAN NOTE
Doing well no acute complaints today.  Given normal CL screening will plan for further MFM follow up PRN.

## 2023-12-16 NOTE — PROGRESS NOTES
MFM Follow-up  12/15/2023         SUBJECTIVE    HPI: Fiorella Leon is a 38 y.o.  at 20w2d here for RPNV. Denies contractions, bleeding, or LOF. Reports normal fetal movement.    OBJECTIVE    Visit Vitals  /74   Wt 101 kg (222 lb 12.8 oz)   LMP  (LMP Unknown)   BMI 34.38 kg/m²   OB Status Pregnant   Smoking Status Never   BSA 2.19 m²        FHT: US    ASSESSMENT & PLAN    Fiorella Leon is a 38 y.o.  at 20w2d here for the following concerns we addressed today:    Antepartum multigravida of advanced maternal age  Doing well no acute complaints today.  Given normal CL screening will plan for further MFM follow up PRN.    History of  delivery, currently pregnant  CL reassuring thus far.       Nathanael Ramos MD

## 2023-12-29 ENCOUNTER — ANCILLARY PROCEDURE (OUTPATIENT)
Dept: RADIOLOGY | Facility: CLINIC | Age: 38
End: 2023-12-29
Payer: COMMERCIAL

## 2023-12-29 DIAGNOSIS — Z32.01 PREGNANCY TEST POSITIVE (HHS-HCC): ICD-10-CM

## 2023-12-29 DIAGNOSIS — O34.32 MATERNAL CARE FOR CERVICAL INCOMPETENCE, SECOND TRIMESTER (HHS-HCC): ICD-10-CM

## 2023-12-29 PROCEDURE — 76817 TRANSVAGINAL US OBSTETRIC: CPT

## 2023-12-29 PROCEDURE — 76815 OB US LIMITED FETUS(S): CPT

## 2023-12-29 PROCEDURE — 76815 OB US LIMITED FETUS(S): CPT | Performed by: STUDENT IN AN ORGANIZED HEALTH CARE EDUCATION/TRAINING PROGRAM

## 2023-12-29 PROCEDURE — 76817 TRANSVAGINAL US OBSTETRIC: CPT | Performed by: STUDENT IN AN ORGANIZED HEALTH CARE EDUCATION/TRAINING PROGRAM

## 2024-01-26 ENCOUNTER — ROUTINE PRENATAL (OUTPATIENT)
Dept: OBSTETRICS AND GYNECOLOGY | Facility: CLINIC | Age: 39
End: 2024-01-26
Payer: COMMERCIAL

## 2024-01-26 ENCOUNTER — CLINICAL SUPPORT (OUTPATIENT)
Dept: GENETICS | Facility: CLINIC | Age: 39
End: 2024-01-26
Payer: COMMERCIAL

## 2024-01-26 ENCOUNTER — HOSPITAL ENCOUNTER (OUTPATIENT)
Dept: RADIOLOGY | Facility: CLINIC | Age: 39
Discharge: HOME | End: 2024-01-26
Payer: COMMERCIAL

## 2024-01-26 VITALS
WEIGHT: 231.5 LBS | DIASTOLIC BLOOD PRESSURE: 68 MMHG | HEART RATE: 86 BPM | SYSTOLIC BLOOD PRESSURE: 101 MMHG | BODY MASS INDEX: 35.72 KG/M2

## 2024-01-26 VITALS — WEIGHT: 231.4 LBS | BODY MASS INDEX: 35.71 KG/M2 | SYSTOLIC BLOOD PRESSURE: 121 MMHG | DIASTOLIC BLOOD PRESSURE: 80 MMHG

## 2024-01-26 DIAGNOSIS — O09.529 ANTEPARTUM MULTIGRAVIDA OF ADVANCED MATERNAL AGE (HHS-HCC): ICD-10-CM

## 2024-01-26 DIAGNOSIS — O09.899 HISTORY OF PRETERM DELIVERY, CURRENTLY PREGNANT (HHS-HCC): ICD-10-CM

## 2024-01-26 DIAGNOSIS — Z31.5 ENCOUNTER FOR PROCREATIVE GENETIC COUNSELING: ICD-10-CM

## 2024-01-26 DIAGNOSIS — O99.210 SEVERE OBESITY DUE TO EXCESS CALORIES AFFECTING PREGNANCY, ANTEPARTUM (MULTI): ICD-10-CM

## 2024-01-26 DIAGNOSIS — E66.01 SEVERE OBESITY DUE TO EXCESS CALORIES AFFECTING PREGNANCY, ANTEPARTUM (MULTI): ICD-10-CM

## 2024-01-26 DIAGNOSIS — Z32.01 PREGNANCY TEST POSITIVE (HHS-HCC): ICD-10-CM

## 2024-01-26 DIAGNOSIS — Z3A.26 26 WEEKS GESTATION OF PREGNANCY (HHS-HCC): Primary | ICD-10-CM

## 2024-01-26 DIAGNOSIS — O28.3 ABNORMAL PRENATAL ULTRASOUND: ICD-10-CM

## 2024-01-26 DIAGNOSIS — Z87.59 HISTORY OF PRE-ECLAMPSIA: ICD-10-CM

## 2024-01-26 PROCEDURE — 76816 OB US FOLLOW-UP PER FETUS: CPT | Performed by: OBSTETRICS & GYNECOLOGY

## 2024-01-26 PROCEDURE — 76816 OB US FOLLOW-UP PER FETUS: CPT

## 2024-01-26 PROCEDURE — 99213 OFFICE O/P EST LOW 20 MIN: CPT | Performed by: STUDENT IN AN ORGANIZED HEALTH CARE EDUCATION/TRAINING PROGRAM

## 2024-01-26 PROCEDURE — 99213 OFFICE O/P EST LOW 20 MIN: CPT | Mod: GE,TH,25,27 | Performed by: STUDENT IN AN ORGANIZED HEALTH CARE EDUCATION/TRAINING PROGRAM

## 2024-01-26 PROCEDURE — 96040 PR MEDICAL GENETICS COUNSELING EACH 30 MINUTES: CPT

## 2024-01-26 PROCEDURE — 96040 HC GENETIC COUNSELING, EACH 30 MIN: CPT

## 2024-01-26 ASSESSMENT — ENCOUNTER SYMPTOMS
CONSTITUTIONAL NEGATIVE: 0
EYES NEGATIVE: 0
GASTROINTESTINAL NEGATIVE: 0
NEUROLOGICAL NEGATIVE: 0
ENDOCRINE NEGATIVE: 0
CARDIOVASCULAR NEGATIVE: 0
PSYCHIATRIC NEGATIVE: 0
ALLERGIC/IMMUNOLOGIC NEGATIVE: 0
MUSCULOSKELETAL NEGATIVE: 0
RESPIRATORY NEGATIVE: 0
HEMATOLOGIC/LYMPHATIC NEGATIVE: 0

## 2024-01-26 NOTE — PROGRESS NOTES
"Subjective   Patient ID [id number]   Fiorella Leon is a 38 y.o.  at 26w2d with a working estimated date of delivery of 2024, by Ultrasound who presents for a routine prenatal visit. She denies vaginal bleeding, leakage of fluid, decreased fetal movements, or contractions.    Objective   Physical Exam  Weight: 105 kg (231 lb 6.4 oz)  Expected Total Weight Gain: 5 kg (11 lb)-9 kg (19 lb)   Pregravid BMI: 31.31  BP: 121/80  Fetal Heart Rate: 147      Prenatal Labs  Urine dip:  Lab Results   Component Value Date    KETONESU NEGATIVE 2023       Lab Results   Component Value Date    HGB 11.5 (L) 10/30/2023    HCT 33.4 (L) 10/30/2023    ABO A 10/30/2023    HEPBSAG Nonreactive 10/30/2023     No results found for: \"PAPPA\", \"AFP\", \"HCG\", \"ESTRIOL\", \"INHBA\"  No results found for: \"GLUF\", \"GLUT1\", \"OUSOXOO5EO\", \"SVIUXPQ6LG\"    Imaging  The most recent ultrasound was performed on The most recent ultrasound study is not finalized with a study GA of The most recent ultrasound study is not finalized and EFW of The most recent ultrasound study is not finalized.  The most recent ultrasound study is not finalized  The most recent ultrasound study is not finalized    Assessment/Plan   Problem List Items Addressed This Visit             ICD-10-CM    History of pre-eclampsia Z87.59    Antepartum multigravida of advanced maternal age O09.529    Relevant Orders    HIV 1/2 Antigen/Antibody Screen with Reflex to Confirmation    Syphilis Screen with Reflex    CBC Anemia Panel With Reflex, Pregnancy    Glucose, 1 Hour Screen, Pregnancy    History of  delivery, currently pregnant O09.899    Obesity complicating pregnancy, childbirth, or puerperium, antepartum O99.210    26 weeks gestation of pregnancy - Primary Z3A.26       Continue prenatal vitamin.  Labs reviewed.  GTT ordered today.  Follow up in 4 weeks for a routine prenatal visit.  "

## 2024-01-26 NOTE — PROGRESS NOTES
"Thank you for the referral of Ms. Fiorella Leon. she is a 38 y.o.,  female who was 26 and 2/7 weeks pregnant at the time of our appointment with an EDC of 2024.  She was seen for genetic counseling due to advanced maternal age. Patient was seen in coordination with AISLINN Rodriguze, genetic counseling intern.     PAST HISTORY:   This is the patient and her partner's 6th pregnancy together.   #1 ended in a second trimester miscarriage due to placental abruption.  #2 healthy 18 year old son delivered at 36 weeks  #3 healthy 15 year old son delivered at 32 weeks  #4 healthy 14 year old son delivered at 37 weeks  #5 first trimester miscarriage  #6 current, predicted male fetal sex    Ultrasounds in the current pregnancy:    Nuchal translucency scan on 10/30/2023:  \"Assigned GA 13 w + 5 d  Assigned ANA CRISTINA:     2024  Impression  =========     A first trimester anatomic survey is being performed for the patients AMA status.  - In utero gestational sac with a live fetus  - Crown rump length is consistent with given ANA CRISTINA  - Fetal organ survey is within normal limits for the gestational age  - Nuchal translucency is within normal limits measuring 2.3 mm  - Normal appearing adnexa  As cfDNA does not screen for other malformations or genetic syndromes, a first trimester anatomic survey and NT was performed and noted to be normal.  A first trimester evaluation cannot exclude major fetal malformations, therefore a second trimester anatomic survey was scheduled.\"    Limited anatomy scan on 2023:  \"Assigned GA 15 w + 6 d  Assigned ANA CRISTINA:     2024  Impression  =========     Patient presents for CL screening due to prior  birth. Pregnancy also complicated by AMA. She has risk reducing cffDNA.     -Normal limited anatomic survey  -Fetal biometry not performed due to the recent evaluation  -Transvaginal evaluation in addition to transabdominal scanning was indicated and performed due to the increased " "accuracy of a transvaginal evaluation to determine  cervical length.  -Cervical length by transvaginal approach measures 34 mm which is within the normal range\"    Limited anatomy scan on 2023:  \"Assigned ANA CRISTINA:     2024  Impression  =========     Patient presents for CL due to prior  birth.     -Normal limited anatomic survey  -Fetal biometry not performed due to the recent evaluation  -Transvaginal evaluation in addition to transabdominal scanning was indicated and performed due to the increased accuracy of a transvaginal evaluation to determine  cervical length.  -Cervical length by transvaginal approach measures 36.1 mm which is within the normal range\"    Anatomy scan on 12/15/2023:  \"Assigned ANA CRISTINA:     2024  Impression  =========     Patient presents for CL due to prior  birth.     -Normal limited anatomic survey  -Fetal biometry not performed due to the recent evaluation  -Transvaginal evaluation in addition to transabdominal scanning was indicated and performed due to the increased accuracy of a transvaginal evaluation to determine  cervical length.  -Cervical length by transvaginal approach measures 36.1 mm which is within the normal range\"    Cervical length on 2023:  \"Assigned GA 22 w + 2 d  Assigned ANA CRISTINA:     2024  Fetal Growth Overview  =================     Exam date        GA              BPD (mm)          HC (mm)            AC (mm)              FL (mm)             HL (mm)          EFW (g)  12/15/2023        20w 2d        48.5     66%        177    37%        153.8     54%        36.3    82%        33     64%        386    78%  Impression  =========     Patient presents for cervical length assessment due to history of multiple  births. Pregnancy additionally complicated by class 1 obesity and AMA (37 at delivery).  She had risk reducing cfDNA.     -Fetal biometry was not repeated due to the short scan interval  -Transvaginal evaluation in addition to " "transabdominal scanning was indicated and performed due to the increased accuracy of a transvaginal evaluation to determine  cervical length.  -Cervical length by transvaginal approach measures 44.3 mm which is within normal range  -Polyhydramnios (MVP 9.1cm) noted.     Often idiopathic, mild and moderate degrees of polyhydramnios may be associated with malformation of the bowel or brain, large for gestational age growth patterns, and  maternal diabetes. There are no findings on this scan or the recent anatomic survey to suggest fetal pathology. The risk for fetal pathology increases with the degree of  polyhydramnios.     The patient was informed of the above. Recommend follow up in 3 weeks to re-evaluate amniotic fluid.\"    Patient has additional ultrasound later today to re-evaluate amniotic fluid levels.    Prior aneuploidy screening: normal standard cell free DNA from [facility]       Prior carrier screening: None    Patient otherwise denies complications such as bleeding or cramping, exposures, infection/illness, and travel outside of the [place] since finding out she was pregnant.    FAMILY HISTORY  Medical and family histories were reviewed and the following concerns regarding this pregnancy were apparent:  Patient:  -Maternal grandmother and maternal great uncle (both )  of complications of kidney failure and were on dialysis. Both had diabetes.   -Paternal grandfather and 2 paternal great uncles  of cancer (lung and throat) and all had reported smoking history.   Partner:    -No reported family history concerns  The remainder of the family history was negative for birth defects, intellectual disability, recurrent pregnancy loss, or recognized inherited conditions.  Consanguinity denied.    REPORTED ETHNICITY/RACE:  Patient:   Patient's partner:     COUNSELING:    The following information was discussed with your patient:    The general population risk of 3-5% for " birth defects in every pregnancy. Polyhydramnios is not technically a birth defect, however, does increase pregnancy risk in general. Often idiopathic, mild and moderate degrees of polyhydramnios may be associated with malformation of the bowel or brain, large for gestational age growth patterns, and maternal diabetes. No findings on ultrasound indicate an abnormality of fetal anatomy, however, we reviewed the limitations of ultrasound and how not all birth defects are always able to be identified.   Patient reported that she plans to have a glucose test and evaluation for maternal diabetes. She does not have a past history of gestational diabetes or concern for diabetes in general.   Chromosomes, genes, sporadic conditions, and inheritance patterns were discussed today. Some of the common chromosome conditions associated with the above findings include Trisomy 13, Trisomy 18, and Trisomy 21, and features of these conditions were reviewed today. It is also possible that there is no identifiable genetic cause. The risk of the current pregnancy being affected by these three chromosome conditions is <1 in 10,000 based on the cell free DNA report above. We reviewed that this test does not assess for all possible genetic conditions. We also reviewed the benefits and limitations of prenatal cell-free DNA screening.  We discussed the methodology for this testing, which includes using cell-free DNA obtained from a mother's blood to screen for the presence of common chromosomal abnormalities. Depending on the laboratory used, there is a >99% sensitivity for Down syndrome, at least 97.4% sensitivity for trisomy 18, and at least 87.5% sensitivity for trisomy 13.  Specificity for these trisomies is >99%. Although sensitivity and specificity rates are high, not all positive results are confirmed to be true positives. False negative results are rare. Therefore, in the event of an abnormal result, prenatal diagnosis through  amniocentesis should be considered to confirm the findings.    Based on the age of 38 y.o. at [employer] alone, at this gestation, the risk for the fetus to have Down syndrome is approximately 1 in 145. The combined risk for the fetus to have Trisomy 21/18/13 is approximately 1 in 115. Again, we have already significantly reduced these risks with negative cell free DNA screening. This does not include copy number variation, mosaicism, single gene disorders, translocations, and marker chromosomes.   The availability, benefits and limitations of ultrasound study. Patient has already had several ultrasounds. An ultrasound study in the second trimester can identify 50% of Down syndrome cases and 90% of trisomy 18 or trisomy 13 cases. We reviewed that she should continue to get additional ultrasounds as recommended as the pregnancy progresses.  The availability of diagnostic fetal chromosome analysis via amniocentesis. The methods, benefits, limitations and risks of amniocentesis and a 1 in 400 risk of complications, including a 1 in 800 risk of miscarriage (and/or  delivery as the pregnancy progresses). This is the only way to confirm a diagnosis, or lack thereof, of any genetic syndromes prenatally.  Per updated ACOG recommendations from 2017, we discussed the availability, benefits, and limitations of carrier screening for cystic fibrosis (CF), spinal muscular atrophy (SMA), and hemoglobinopathies/thalassemias. We reviewed the carrier frequencies of these conditions, varied clinical manifestations, and their autosomal recessive inheritance. The patient has already had negative carrier screening for hemoglobinopathies and thalassemias via CBC and hemoglobin electrophoresis and these results were reviewed.  screening is available for CF, hemoglobinopathies, and thalassemias, and SMA.    We also discussed the availability of more expanded/pan ethnic carrier screening for additional, primarily autosomal  recessive, conditions. We discussed the pros and cons of expanded carrier screening including the higher likelihood of being identified as a carrier for at least one condition on a larger panel.  If both members of the couple are found to be carriers for the same condition, there is a 25% chance for an affected child and prenatal diagnosis would be available. Approximately 2-4% of couples are found to be at risk to have a child with a genetic disorder based on this screening. In rare cases, expanded carrier screening results may have health implications for the tested individual.   Additional family history concerns:  Kidney disease. Based on the history provided, the maternal family history of kidney disease seems to be due to unmanaged diabetes. Ms. Leon should inform her primary care doctor of the family history so that proper screening with early detection and early intervention can be pursued. This tends to maximize outcomes.   Cancer. We discussed that cancer is a combination of genetic and environment. We reviewed healthy lifestyle habits and general recommendations for cancer screening. General population screening recommendations for breast cancer risk begin at age 40 and include annual mammography.       DISPOSITION:  The patient stated that she understood the above information and stated that she will attend her OB appointment and ultrasound appointment later today. She stated that her OB plans to manage maternal diabetes risk and screening. She declined additional genetic testing at this time, including carrier screening and amniocentesis.     I spent 55 minutes with the patient with greater than 50% of the time spent in face to face counseling.     Thank you for allowing us to participate in the care of your patient.  Should you or your patient have any questions, please do not hesitate to contact our office at [phone].    Sincerely,  Pat Fontenot, [facility]

## 2024-01-26 NOTE — PROGRESS NOTES
I reviewed the resident/fellow's documentation and discussed the patient with the resident/fellow. I agree with the resident/fellow's medical decision making as documented in the note.     Viviana Grant MD

## 2024-01-31 ENCOUNTER — HOSPITAL ENCOUNTER (INPATIENT)
Facility: HOSPITAL | Age: 39
LOS: 7 days | Discharge: HOME | End: 2024-02-07
Attending: OBSTETRICS & GYNECOLOGY | Admitting: OBSTETRICS & GYNECOLOGY
Payer: MEDICAID

## 2024-01-31 DIAGNOSIS — O24.414 INSULIN CONTROLLED GESTATIONAL DIABETES MELLITUS (GDM) IN SECOND TRIMESTER (HHS-HCC): Primary | ICD-10-CM

## 2024-01-31 PROBLEM — O45.90: Status: ACTIVE | Noted: 2024-01-31

## 2024-01-31 LAB
ABO GROUP (TYPE) IN BLOOD: NORMAL
ANTIBODY SCREEN: NORMAL
APTT PPP: 26 SECONDS (ref 27–38)
BILIRUBIN, POC: NEGATIVE
BLOOD URINE, POC: POSITIVE
CLARITY, POC: CLEAR
COLOR, POC: YELLOW
ERYTHROCYTE [DISTWIDTH] IN BLOOD BY AUTOMATED COUNT: 12.6 % (ref 11.5–14.5)
FIBRINOGEN PPP-MCNC: 431 MG/DL (ref 200–400)
GLUCOSE 1H P 50 G GLC PO SERPL-MCNC: 145 MG/DL
GLUCOSE BLD MANUAL STRIP-MCNC: 127 MG/DL (ref 74–99)
GLUCOSE URINE, POC: NEGATIVE
HCT VFR BLD AUTO: 31.2 % (ref 36–46)
HGB BLD-MCNC: 10.4 G/DL (ref 12–16)
HOLD SPECIMEN: NORMAL
INR PPP: 1 (ref 0.9–1.1)
KETONES, POC: NEGATIVE
LEUKOCYTE EST, POC: NEGATIVE
MCH RBC QN AUTO: 32.4 PG (ref 26–34)
MCHC RBC AUTO-ENTMCNC: 33.3 G/DL (ref 32–36)
MCV RBC AUTO: 97 FL (ref 80–100)
NITRITE, POC: NEGATIVE
NRBC BLD-RTO: 0 /100 WBCS (ref 0–0)
PH, POC: 6.5
PLATELET # BLD AUTO: 266 X10*3/UL (ref 150–450)
POC APPEARANCE OF BODY FLUID: NORMAL
PROTHROMBIN TIME: 10.9 SECONDS (ref 9.8–12.8)
RBC # BLD AUTO: 3.21 X10*6/UL (ref 4–5.2)
RH FACTOR (ANTIGEN D): NORMAL
SPECIFIC GRAVITY, POC: 1.03
URINE PROTEIN, POC: NEGATIVE
UROBILINOGEN, POC: 1
WBC # BLD AUTO: 8.5 X10*3/UL (ref 4.4–11.3)

## 2024-01-31 PROCEDURE — 82947 ASSAY GLUCOSE BLOOD QUANT: CPT | Performed by: STUDENT IN AN ORGANIZED HEALTH CARE EDUCATION/TRAINING PROGRAM

## 2024-01-31 PROCEDURE — 36415 COLL VENOUS BLD VENIPUNCTURE: CPT

## 2024-01-31 PROCEDURE — 82947 ASSAY GLUCOSE BLOOD QUANT: CPT

## 2024-01-31 PROCEDURE — 99222 1ST HOSP IP/OBS MODERATE 55: CPT

## 2024-01-31 PROCEDURE — 2500000001 HC RX 250 WO HCPCS SELF ADMINISTERED DRUGS (ALT 637 FOR MEDICARE OP): Performed by: STUDENT IN AN ORGANIZED HEALTH CARE EDUCATION/TRAINING PROGRAM

## 2024-01-31 PROCEDURE — 85610 PROTHROMBIN TIME: CPT

## 2024-01-31 PROCEDURE — 2500000004 HC RX 250 GENERAL PHARMACY W/ HCPCS (ALT 636 FOR OP/ED): Performed by: STUDENT IN AN ORGANIZED HEALTH CARE EDUCATION/TRAINING PROGRAM

## 2024-01-31 PROCEDURE — 87081 CULTURE SCREEN ONLY: CPT

## 2024-01-31 PROCEDURE — 85027 COMPLETE CBC AUTOMATED: CPT

## 2024-01-31 PROCEDURE — 36415 COLL VENOUS BLD VENIPUNCTURE: CPT | Performed by: STUDENT IN AN ORGANIZED HEALTH CARE EDUCATION/TRAINING PROGRAM

## 2024-01-31 PROCEDURE — 59025 FETAL NON-STRESS TEST: CPT | Mod: GC

## 2024-01-31 PROCEDURE — 1220000001 HC OB SEMI-PRIVATE ROOM DAILY

## 2024-01-31 PROCEDURE — 86901 BLOOD TYPING SEROLOGIC RH(D): CPT | Performed by: STUDENT IN AN ORGANIZED HEALTH CARE EDUCATION/TRAINING PROGRAM

## 2024-01-31 PROCEDURE — 85384 FIBRINOGEN ACTIVITY: CPT

## 2024-01-31 PROCEDURE — 59025 FETAL NON-STRESS TEST: CPT

## 2024-01-31 RX ORDER — LABETALOL HYDROCHLORIDE 5 MG/ML
20 INJECTION, SOLUTION INTRAVENOUS ONCE AS NEEDED
Status: DISCONTINUED | OUTPATIENT
Start: 2024-01-31 | End: 2024-02-07 | Stop reason: HOSPADM

## 2024-01-31 RX ORDER — ONDANSETRON 4 MG/1
4 TABLET, FILM COATED ORAL EVERY 6 HOURS PRN
Status: DISCONTINUED | OUTPATIENT
Start: 2024-01-31 | End: 2024-02-07 | Stop reason: HOSPADM

## 2024-01-31 RX ORDER — LABETALOL HYDROCHLORIDE 5 MG/ML
20 INJECTION, SOLUTION INTRAVENOUS ONCE AS NEEDED
Status: DISCONTINUED | OUTPATIENT
Start: 2024-01-31 | End: 2024-01-31

## 2024-01-31 RX ORDER — SIMETHICONE 80 MG
80 TABLET,CHEWABLE ORAL 4 TIMES DAILY PRN
Status: DISCONTINUED | OUTPATIENT
Start: 2024-01-31 | End: 2024-02-07 | Stop reason: HOSPADM

## 2024-01-31 RX ORDER — ONDANSETRON HYDROCHLORIDE 2 MG/ML
4 INJECTION, SOLUTION INTRAVENOUS EVERY 6 HOURS PRN
Status: DISCONTINUED | OUTPATIENT
Start: 2024-01-31 | End: 2024-02-07 | Stop reason: HOSPADM

## 2024-01-31 RX ORDER — NIFEDIPINE 10 MG/1
10 CAPSULE ORAL ONCE AS NEEDED
Status: DISCONTINUED | OUTPATIENT
Start: 2024-01-31 | End: 2024-02-07 | Stop reason: HOSPADM

## 2024-01-31 RX ORDER — HYDRALAZINE HYDROCHLORIDE 20 MG/ML
5 INJECTION INTRAMUSCULAR; INTRAVENOUS ONCE AS NEEDED
Status: DISCONTINUED | OUTPATIENT
Start: 2024-01-31 | End: 2024-01-31

## 2024-01-31 RX ORDER — LIDOCAINE HYDROCHLORIDE 10 MG/ML
0.5 INJECTION INFILTRATION; PERINEURAL ONCE AS NEEDED
Status: DISCONTINUED | OUTPATIENT
Start: 2024-01-31 | End: 2024-01-31

## 2024-01-31 RX ORDER — METOCLOPRAMIDE 10 MG/1
10 TABLET ORAL EVERY 6 HOURS PRN
Status: DISCONTINUED | OUTPATIENT
Start: 2024-01-31 | End: 2024-02-07 | Stop reason: HOSPADM

## 2024-01-31 RX ORDER — LIDOCAINE HYDROCHLORIDE 10 MG/ML
0.5 INJECTION INFILTRATION; PERINEURAL ONCE AS NEEDED
Status: DISCONTINUED | OUTPATIENT
Start: 2024-01-31 | End: 2024-02-07 | Stop reason: HOSPADM

## 2024-01-31 RX ORDER — NIFEDIPINE 10 MG/1
10 CAPSULE ORAL ONCE AS NEEDED
Status: DISCONTINUED | OUTPATIENT
Start: 2024-01-31 | End: 2024-01-31

## 2024-01-31 RX ORDER — BETAMETHASONE SODIUM PHOSPHATE AND BETAMETHASONE ACETATE 3; 3 MG/ML; MG/ML
12 INJECTION, SUSPENSION INTRA-ARTICULAR; INTRALESIONAL; INTRAMUSCULAR; SOFT TISSUE DAILY
Status: COMPLETED | OUTPATIENT
Start: 2024-01-31 | End: 2024-02-01

## 2024-01-31 RX ORDER — ADHESIVE BANDAGE
10 BANDAGE TOPICAL
Status: DISCONTINUED | OUTPATIENT
Start: 2024-01-31 | End: 2024-02-07 | Stop reason: HOSPADM

## 2024-01-31 RX ORDER — BISACODYL 10 MG/1
10 SUPPOSITORY RECTAL DAILY PRN
Status: DISCONTINUED | OUTPATIENT
Start: 2024-01-31 | End: 2024-02-07 | Stop reason: HOSPADM

## 2024-01-31 RX ORDER — ONDANSETRON 4 MG/1
4 TABLET, FILM COATED ORAL EVERY 6 HOURS PRN
Status: DISCONTINUED | OUTPATIENT
Start: 2024-01-31 | End: 2024-01-31

## 2024-01-31 RX ORDER — ONDANSETRON HYDROCHLORIDE 2 MG/ML
4 INJECTION, SOLUTION INTRAVENOUS EVERY 6 HOURS PRN
Status: DISCONTINUED | OUTPATIENT
Start: 2024-01-31 | End: 2024-01-31

## 2024-01-31 RX ORDER — HYDRALAZINE HYDROCHLORIDE 20 MG/ML
5 INJECTION INTRAMUSCULAR; INTRAVENOUS ONCE AS NEEDED
Status: DISCONTINUED | OUTPATIENT
Start: 2024-01-31 | End: 2024-02-07 | Stop reason: HOSPADM

## 2024-01-31 RX ORDER — POLYETHYLENE GLYCOL 3350 17 G/17G
17 POWDER, FOR SOLUTION ORAL 2 TIMES DAILY PRN
Status: DISCONTINUED | OUTPATIENT
Start: 2024-01-31 | End: 2024-02-07 | Stop reason: HOSPADM

## 2024-01-31 RX ORDER — METOCLOPRAMIDE HYDROCHLORIDE 5 MG/ML
10 INJECTION INTRAMUSCULAR; INTRAVENOUS EVERY 6 HOURS PRN
Status: DISCONTINUED | OUTPATIENT
Start: 2024-01-31 | End: 2024-02-07 | Stop reason: HOSPADM

## 2024-01-31 RX ORDER — SODIUM CHLORIDE, SODIUM LACTATE, POTASSIUM CHLORIDE, CALCIUM CHLORIDE 600; 310; 30; 20 MG/100ML; MG/100ML; MG/100ML; MG/100ML
125 INJECTION, SOLUTION INTRAVENOUS CONTINUOUS
Status: DISCONTINUED | OUTPATIENT
Start: 2024-01-31 | End: 2024-02-07 | Stop reason: HOSPADM

## 2024-01-31 RX ADMIN — SODIUM CHLORIDE, POTASSIUM CHLORIDE, SODIUM LACTATE AND CALCIUM CHLORIDE 125 ML/HR: 600; 310; 30; 20 INJECTION, SOLUTION INTRAVENOUS at 07:01

## 2024-01-31 RX ADMIN — PRENATAL VIT W/ FE FUMARATE-FA TAB 27-0.8 MG 1 TABLET: 27-0.8 TAB at 08:09

## 2024-01-31 RX ADMIN — BETAMETHASONE ACETATE AND BETAMETHASONE SODIUM PHOSPHATE 12 MG: 3; 3 INJECTION, SUSPENSION INTRA-ARTICULAR; INTRALESIONAL; INTRAMUSCULAR; SOFT TISSUE at 17:31

## 2024-01-31 SDOH — HEALTH STABILITY: MENTAL HEALTH: HAVE YOU USED ANY SUBSTANCES (CANABIS, COCAINE, HEROIN, HALLUCINOGENS, INHALANTS, ETC.) IN THE PAST 12 MONTHS?: NO

## 2024-01-31 SDOH — SOCIAL STABILITY: SOCIAL INSECURITY: HAS ANYONE EVER THREATENED TO HURT YOUR FAMILY OR YOUR PETS?: NO

## 2024-01-31 SDOH — SOCIAL STABILITY: SOCIAL INSECURITY: ARE THERE ANY APPARENT SIGNS OF INJURIES/BEHAVIORS THAT COULD BE RELATED TO ABUSE/NEGLECT?: NO

## 2024-01-31 SDOH — HEALTH STABILITY: MENTAL HEALTH: WERE YOU ABLE TO COMPLETE ALL THE BEHAVIORAL HEALTH SCREENINGS?: YES

## 2024-01-31 SDOH — SOCIAL STABILITY: SOCIAL INSECURITY: ARE YOU OR HAVE YOU BEEN THREATENED OR ABUSED PHYSICALLY, EMOTIONALLY, OR SEXUALLY BY ANYONE?: NO

## 2024-01-31 SDOH — SOCIAL STABILITY: SOCIAL INSECURITY: VERBAL ABUSE: DENIES

## 2024-01-31 SDOH — SOCIAL STABILITY: SOCIAL INSECURITY: ABUSE SCREEN: ADULT

## 2024-01-31 SDOH — SOCIAL STABILITY: SOCIAL INSECURITY: HAVE YOU HAD THOUGHTS OF HARMING ANYONE ELSE?: NO

## 2024-01-31 SDOH — ECONOMIC STABILITY: HOUSING INSECURITY: DO YOU FEEL UNSAFE GOING BACK TO THE PLACE WHERE YOU ARE LIVING?: NO

## 2024-01-31 SDOH — SOCIAL STABILITY: SOCIAL INSECURITY: PHYSICAL ABUSE: DENIES

## 2024-01-31 SDOH — SOCIAL STABILITY: SOCIAL INSECURITY: DOES ANYONE TRY TO KEEP YOU FROM HAVING/CONTACTING OTHER FRIENDS OR DOING THINGS OUTSIDE YOUR HOME?: NO

## 2024-01-31 SDOH — HEALTH STABILITY: MENTAL HEALTH: STRENGTHS (MUST CHOOSE TWO): SUPPORT FROM FAMILY;SUPPORT FROM FRIENDS

## 2024-01-31 SDOH — HEALTH STABILITY: MENTAL HEALTH: HAVE YOU USED ANY PRESCRIPTION DRUGS OTHER THAN PRESCRIBED IN THE PAST 12 MONTHS?: NO

## 2024-01-31 SDOH — SOCIAL STABILITY: SOCIAL INSECURITY: DO YOU FEEL ANYONE HAS EXPLOITED OR TAKEN ADVANTAGE OF YOU FINANCIALLY OR OF YOUR PERSONAL PROPERTY?: NO

## 2024-01-31 ASSESSMENT — LIFESTYLE VARIABLES
SKIP TO QUESTIONS 9-10: 1
HOW OFTEN DO YOU HAVE A DRINK CONTAINING ALCOHOL: NEVER
SKIP TO QUESTIONS 9-10: 1
AUDIT-C TOTAL SCORE: 0
EVER FELT BAD OR GUILTY ABOUT YOUR DRINKING: NO
HOW OFTEN DO YOU HAVE 6 OR MORE DRINKS ON ONE OCCASION: NEVER
HAVE PEOPLE ANNOYED YOU BY CRITICIZING YOUR DRINKING: NO
HOW OFTEN DO YOU HAVE 6 OR MORE DRINKS ON ONE OCCASION: NEVER
HOW MANY STANDARD DRINKS CONTAINING ALCOHOL DO YOU HAVE ON A TYPICAL DAY: PATIENT DOES NOT DRINK
EVER HAD A DRINK FIRST THING IN THE MORNING TO STEADY YOUR NERVES TO GET RID OF A HANGOVER: NO
AUDIT-C TOTAL SCORE: 0
HOW OFTEN DO YOU HAVE A DRINK CONTAINING ALCOHOL: NEVER
HAVE YOU EVER FELT YOU SHOULD CUT DOWN ON YOUR DRINKING: NO
AUDIT-C TOTAL SCORE: 0
HOW MANY STANDARD DRINKS CONTAINING ALCOHOL DO YOU HAVE ON A TYPICAL DAY: PATIENT DOES NOT DRINK
AUDIT-C TOTAL SCORE: 0

## 2024-01-31 ASSESSMENT — PAIN SCALES - GENERAL
PAINLEVEL_OUTOF10: 0 - NO PAIN

## 2024-01-31 ASSESSMENT — PATIENT HEALTH QUESTIONNAIRE - PHQ9
2. FEELING DOWN, DEPRESSED OR HOPELESS: NOT AT ALL
1. LITTLE INTEREST OR PLEASURE IN DOING THINGS: NOT AT ALL
SUM OF ALL RESPONSES TO PHQ9 QUESTIONS 1 & 2: 0

## 2024-01-31 ASSESSMENT — PAIN - FUNCTIONAL ASSESSMENT
PAIN_FUNCTIONAL_ASSESSMENT: 0-10

## 2024-01-31 ASSESSMENT — ACTIVITIES OF DAILY LIVING (ADL): LACK_OF_TRANSPORTATION: NO

## 2024-01-31 ASSESSMENT — COLUMBIA-SUICIDE SEVERITY RATING SCALE - C-SSRS
2. HAVE YOU ACTUALLY HAD ANY THOUGHTS OF KILLING YOURSELF?: NO
1. IN THE PAST MONTH, HAVE YOU WISHED YOU WERE DEAD OR WISHED YOU COULD GO TO SLEEP AND NOT WAKE UP?: NO
6. HAVE YOU EVER DONE ANYTHING, STARTED TO DO ANYTHING, OR PREPARED TO DO ANYTHING TO END YOUR LIFE?: NO

## 2024-01-31 NOTE — CARE PLAN
The patient's goals for the shift include      The clinical goals for the shift include remain free of bright red VB

## 2024-01-31 NOTE — ED TRIAGE NOTES
Patient reports vaginal bleeding that began tonight. States she is 27 weeks pregnant. Due date May 1st.

## 2024-01-31 NOTE — H&P
Obstetrical Admission History and Physical     Fiorella Leon is a 38 y.o.  by LMP c/w 7 week US admitted for observation of vaginal bleeding    Chief Complaint: Vaginal Bleeding - Pregnant (Dark blood  dime size clot)    Assessment/Plan      VB  - Patient with dark red vaginal bleeding   - SSE with ~5cc of dark red blood in vagina and from cervix. Cervix visually closed.   - Patient had recent STI testing in October and no concern for STI exposure. GC/CT deferred  - Low concern for PTL at this time given no cramping, no contractions on TOCO, and cx closed  - Given new vaginal bleeding, there is concern for placental abruption  - Rh positive, no indication for Rhogam  - CBC Hgb at 10.5 (last 11.5 on ). Coags and fibrinogen wnl  - No obvious signs of abruption on BSUS. Plan to obtain SSUS   - For MFM consult    Hx of PEC  - Normotensive on presentation  - baseline HELLP labs nl, P:C 0.08  - On bbASA     Fetal Wellbeing  - PNLs reviewed and wnl ()  - Recent growth  AGA  - GBS collected in triage  - 1hr GTT ordered  - NST AGA  - Cephalic ()    PPBC: desires ppMirena    Dispo: Admit for observation of vaginal bleeding and SSUS    Discussed with Dr. Anayeli Giron MD, PGY-1    Subjective   Fiorella is here complaining of vaginal bleeding. Patient states she has had light pink spotting on and off for the majority of her pregnancy that was not concerning to her. Earlier tonight, she has another episode of light pink spotting. One hour prior to presentation, she had dark red blood in her underwear with dime sized clots. She has not felt continued bleeding, but had dark red blood with wiping when she used the restroom in triage. No recent abdominal trauma, falls, or sexual intercourse. Denies vaginal discharge, concern for STIs. No suprapubic tenderness, flank pain, or dysuria. Not having any contractions, cramping, or pain.    Good fetal movement. Denies leaking of fluid.      Pregnancy  notable for:  - Hx of PTLx2 (30&36wks) - nl CL  - Hx of PreE  - baseline HELLP labs nl, P:C 0.08. On bbASA  - Hx of abruption x1  - AMA - cfDNA RR  - Polyhydramnios, resolved on  scan     Obstetrical History   OB History    Para Term  AB Living   5 2 0 2 2 2   SAB IAB Ectopic Multiple Live Births   2 0 0 0 2      # Outcome Date GA Lbr Jayce/2nd Weight Sex Delivery Anes PTL Lv   5 Current            4 2023 8w0d          3   30w0d   M Vag-Spont  Y ELIZABETH   2   36w0d  2.948 kg M Vag-Spont EPI N ELIZABETH      Complications: Preeclampsia   1 2002 16w0d   F          Complications: Abruptio Placenta       Past Medical History  Past Medical History:   Diagnosis Date    History of placenta abruption 2023        Past Surgical History   Past Surgical History:   Procedure Laterality Date    MOUTH SURGERY  2017    Oral Surgery Tooth Extraction       Social History  Social History     Tobacco Use    Smoking status: Never    Smokeless tobacco: Never   Substance Use Topics    Alcohol use: Never     Substance and Sexual Activity   Drug Use Never       Allergies  Patient has no known allergies.     Medications  Medications Prior to Admission   Medication Sig Dispense Refill Last Dose    aspirin 81 mg EC tablet Take 2 tablets (162 mg) by mouth once daily. 60 tablet 11 2024    docusate sodium (Colace) 100 mg capsule Take 1 capsule (100 mg) by mouth 2 times a day as needed for constipation. 30 capsule 4     metoclopramide (Reglan) 10 mg tablet Take 1 tablet (10 mg) by mouth every 6 hours. 30 tablet 1     PNV no.95/ferrous fum/folic ac (PRENATAL MULTIVITAMINS ORAL) Take by mouth.   2024       Objective    Last Vitals  Temp Pulse Resp BP MAP O2 Sat   36.8 °C (98.2 °F) 96 16 116/72   (!) 94 %     Physical Examination  GENERAL: Examination reveals a well developed, well nourished, gravid female in no acute distress. She is alert and cooperative.  LUNGS:  Normal work of breathing  on room air  HEART:  regular rate  ABDOMEN:  Soft, gravid, nontender, no palpable contractions  CERVIX:  SSE: ~5cc of dark red blood in vaginal vault and from cervical os      EXTREMITIES:  Full ROM  NEUROLOGICAL: alert, oriented, normal speech, no focal findings or movement disorder noted    Cervical Exam  Dilation: Closed  Method: Sterile speculum  OB Examiner: Bree lares  Fetal Assessment  Movement: Present  Mode:  (pt up to bathroom)  Baseline Fetal Heart Rate (bpm): 145 bpm  Baseline Classification: Normal  Variability: Moderate (Between 6 and 25 BPM)  Pattern: Accelerations   Fetal Decelerations: No  Contraction Frequency: occassional    Cephalic on BSUS with no obvious signs of abruption    Lab Review  Lab Results   Component Value Date    WBC 8.5 01/31/2024    HGB 10.4 (L) 01/31/2024    HCT 31.2 (L) 01/31/2024     01/31/2024     Lab Results   Component Value Date    APTT 26 (L) 01/31/2024    PROTIME 10.9 01/31/2024    INR 1.0 01/31/2024     Lab Results   Component Value Date    FIBRINOGEN 431 (H) 01/31/2024

## 2024-01-31 NOTE — SIGNIFICANT EVENT
Patient meets criteria for home monitoring of blood pressure post discharge.  Met with patient to assess for availability of home BP monitor.  Patient does not have access to BP monitor at home.  Pt agreed to order home BP monitor from [employer]. Large BP monitor delivered to room.  Patient educated on how to use BP monitor, recording BP's on home monitoring log and s/sx to report to her provider.  Pt verbalized understanding the above information.

## 2024-01-31 NOTE — CONSULTS
"Consult Note  2024, 2:33 PM     SUBJECTIVE:    History of Present Illness  Fiorella Leon is a 38 y.o.  27w0d by LMP c/w 7 week US admitted for observation of vaginal bleeding     Chief Complaint: Vaginal Bleeding - Pregnant (Dark blood  dime size clot)    HPI: The patient stated that she had bleeding that started yesterday () at 8 p.m. in the bathroom.  It was light but got heavier until 1 a.m. when the bleeding was dark red.  She described bleeding through tissues/pads and requiring multiple to wipe away the blood.  She had no pain during the episode.  The bleeding has since stopped.  She denied contractions, gush of fluid, and decreased fetal movement.      OB history:   OB History    Para Term  AB Living   5 2 0 2 2 2   SAB IAB Ectopic Multiple Live Births   2 0 0 0 2      # Outcome Date GA Lbr Jayce/2nd Weight Sex Delivery Anes PTL Lv   5 Current            4 SAB 2023 8w0d          3   30w0d   M Vag-Spont  Y ELIZABETH   2   36w0d  2.948 kg M Vag-Spont EPI N ELIZABETH      Complications: Preeclampsia   1 2002 16w0d   F          Complications: Abruptio Placenta      During her previous pregnancies, she described the first pregnancy ended with placental abruption losing the child.  The second pregnancy was complicated by preeclampsia and  delivery.  The third pregnancy also resulted in  delivery.  The fourth pregnancy ended in a first trimester miscarriage.        PMH: Patient's medical history is uncomplicated outside of pregnancy.    Meds: bbASA, prenatal vitamins  Surgical Hx: none  Allergies: none   Vaccinations: UTD    FH: Mother has hypertension.  Distant aunts and uncles with different types of cancer and diabetes.      SH: Lives with kids, works from home. No smoking, alcohol, or other drugs        OBJECTIVE:   BP 99/62   Pulse 88   Temp 36.9 °C (98.4 °F) (Temporal)   Resp 18   Ht 1.727 m (5' 7.99\")   Wt 106 kg (232 lb 12.9 oz)   LMP  (LMP " Unknown)   SpO2 98%   BMI 35.41 kg/m²    Temp  Min: 36.5 °C (97.7 °F)  Max: 36.9 °C (98.4 °F)  Pulse  Min: 74  Max: 96  BP  Min: 99/62  Max: 127/68    General:  AAOx3, No acute distress  Cardiovascular: Warm and well perfused  Respiratory: Normal respiratory effort   Abdominal:  Soft, gravid, non-tender, no rebound or guarding, no palpable contractions     NST: Baseline 145 / moderate variability, positive accels/  no decels  [place]:  no contractions    Labs:   Lab Results   Component Value Date    WBC 8.5 2024    HGB 10.4 (L) 2024    HCT 31.2 (L) 2024     2024     Lab Results   Component Value Date    APTT 26 (L) 2024    PROTIME 10.9 2024    INR 1.0 2024          Contains abnormal data Glucose, 1 Hour Screen, Pregnancy    Status: Final result        Component  Ref Range & Units 24 0911   Glucose, 1 Hr Screen, Preg  <135 mg/dL 145 High    Resulting Agency [facility]   Narrative    Diagnostic value with glucose loading dose of 50 g. Reference values from American Diabetes Association. Diabetes Care 2015;38(Suppl.1):S8-S16        Specimen Collected: 24 0911 Last Resulted: 24 1427 View Other Order Details          ASSESSMENT AND PLAN:     38 y.o.  at 27w0d by LMP c/w 7 wk U/S with vaginal bleeding.     VB  - On admission, SSE with ~5cc of dark red blood in vagina and from cervix. Cervix visually closed.   - Patient had recent STI testing in October and no concern for STI exposure. GC/CT deferred  - Low concern for PTL at this time given no cramping, no contractions on TOCO, and cx closed  - Low Suspicion for Placenta Previa given U/S on  of anterior position  - Given new vaginal bleeding, there is concern for placental abruption  - Rh positive, no indication for Rhogam  - CBC Hgb at 10.5 (last 11.5 on 10/30). Coags and fibrinogen wnl  - No obvious signs of abruption on BSUS. Plan to obtain SSUS      Hx of PEC  - Normotensive on presentation  -  baseline HELLP labs nl, baseline P:C 0.08  - On bbASA     Elevated Glucose Levels  - 1hr screen - 145  - pending 3 hour confirmation test    Fetal status: Reassuring, NST AGA   - continue daily NSTs while inpatient   - Ultrasound (1/26):  (53%), cephalic   - Will discuss role of BMZ with patient  - NICU Consult if appropriate    Routine:  - GBS: In progress  - TDAP & TDAP: Said done with primary  - 1hr: 145 done on admission, pending 3 hour confirmation  - BCM: stas Oneal     Dispo: Recommend inpatient monitoring for at least 24 hours from time of bleed.     SERGIO HEREDIA MS3    Pt seen with medical student, agree with plan above and edits made in text as needed.  Angelita Yoon MD PGY-2  MFM [id number]

## 2024-02-01 ENCOUNTER — APPOINTMENT (OUTPATIENT)
Dept: RADIOLOGY | Facility: HOSPITAL | Age: 39
End: 2024-02-01
Payer: MEDICAID

## 2024-02-01 LAB
APTT PPP: 26 SECONDS (ref 27–38)
ERYTHROCYTE [DISTWIDTH] IN BLOOD BY AUTOMATED COUNT: 12.2 % (ref 11.5–14.5)
FERRITIN SERPL-MCNC: 23 NG/ML (ref 8–150)
FIBRINOGEN PPP-MCNC: 500 MG/DL (ref 200–400)
FOLATE SERPL-MCNC: 23 NG/ML
GLUCOSE BLD MANUAL STRIP-MCNC: 160 MG/DL (ref 74–99)
GLUCOSE BLD MANUAL STRIP-MCNC: 180 MG/DL (ref 74–99)
GLUCOSE BLD MANUAL STRIP-MCNC: 187 MG/DL (ref 74–99)
GLUCOSE BLD MANUAL STRIP-MCNC: 272 MG/DL (ref 74–99)
GLUCOSE P FAST SERPL-MCNC: 185 MG/DL
HCT VFR BLD AUTO: 31.1 % (ref 36–46)
HGB BLD-MCNC: 11.1 G/DL (ref 12–16)
INR PPP: 1 (ref 0.9–1.1)
IRON SATN MFR SERPL: 33 %
IRON SERPL-MCNC: 161 UG/DL
MCH RBC QN AUTO: 32.8 PG (ref 26–34)
MCHC RBC AUTO-ENTMCNC: 35.7 G/DL (ref 32–36)
MCV RBC AUTO: 92 FL (ref 80–100)
NRBC BLD-RTO: 0 /100 WBCS (ref 0–0)
PLATELET # BLD AUTO: 294 X10*3/UL (ref 150–450)
PROTHROMBIN TIME: 11 SECONDS (ref 9.8–12.8)
RBC # BLD AUTO: 3.38 X10*6/UL (ref 4–5.2)
TIBC SERPL-MCNC: 482 UG/DL
UIBC SERPL-MCNC: 321 UG/DL
VIT B12 SERPL-MCNC: 381 PG/ML (ref 211–911)
WBC # BLD AUTO: 13.6 X10*3/UL (ref 4.4–11.3)

## 2024-02-01 PROCEDURE — 82746 ASSAY OF FOLIC ACID SERUM: CPT

## 2024-02-01 PROCEDURE — 36415 COLL VENOUS BLD VENIPUNCTURE: CPT

## 2024-02-01 PROCEDURE — 82607 VITAMIN B-12: CPT

## 2024-02-01 PROCEDURE — 2500000001 HC RX 250 WO HCPCS SELF ADMINISTERED DRUGS (ALT 637 FOR MEDICARE OP): Performed by: STUDENT IN AN ORGANIZED HEALTH CARE EDUCATION/TRAINING PROGRAM

## 2024-02-01 PROCEDURE — 85027 COMPLETE CBC AUTOMATED: CPT

## 2024-02-01 PROCEDURE — 2500000004 HC RX 250 GENERAL PHARMACY W/ HCPCS (ALT 636 FOR OP/ED): Performed by: STUDENT IN AN ORGANIZED HEALTH CARE EDUCATION/TRAINING PROGRAM

## 2024-02-01 PROCEDURE — 90471 IMMUNIZATION ADMIN: CPT | Performed by: STUDENT IN AN ORGANIZED HEALTH CARE EDUCATION/TRAINING PROGRAM

## 2024-02-01 PROCEDURE — 76818 FETAL BIOPHYS PROFILE W/NST: CPT

## 2024-02-01 PROCEDURE — 85384 FIBRINOGEN ACTIVITY: CPT

## 2024-02-01 PROCEDURE — 1220000001 HC OB SEMI-PRIVATE ROOM DAILY

## 2024-02-01 PROCEDURE — 82947 ASSAY GLUCOSE BLOOD QUANT: CPT

## 2024-02-01 PROCEDURE — 90715 TDAP VACCINE 7 YRS/> IM: CPT | Performed by: STUDENT IN AN ORGANIZED HEALTH CARE EDUCATION/TRAINING PROGRAM

## 2024-02-01 PROCEDURE — 82728 ASSAY OF FERRITIN: CPT

## 2024-02-01 PROCEDURE — 85610 PROTHROMBIN TIME: CPT

## 2024-02-01 PROCEDURE — 99232 SBSQ HOSP IP/OBS MODERATE 35: CPT | Performed by: STUDENT IN AN ORGANIZED HEALTH CARE EDUCATION/TRAINING PROGRAM

## 2024-02-01 PROCEDURE — 83550 IRON BINDING TEST: CPT

## 2024-02-01 PROCEDURE — 2500000002 HC RX 250 W HCPCS SELF ADMINISTERED DRUGS (ALT 637 FOR MEDICARE OP, ALT 636 FOR OP/ED): Performed by: STUDENT IN AN ORGANIZED HEALTH CARE EDUCATION/TRAINING PROGRAM

## 2024-02-01 PROCEDURE — 76818 FETAL BIOPHYS PROFILE W/NST: CPT | Performed by: OBSTETRICS & GYNECOLOGY

## 2024-02-01 RX ORDER — INSULIN LISPRO 100 [IU]/ML
0-5 INJECTION, SOLUTION INTRAVENOUS; SUBCUTANEOUS
Status: DISCONTINUED | OUTPATIENT
Start: 2024-02-01 | End: 2024-02-07 | Stop reason: HOSPADM

## 2024-02-01 RX ORDER — DEXTROSE 50 % IN WATER (D50W) INTRAVENOUS SYRINGE
25
Status: DISCONTINUED | OUTPATIENT
Start: 2024-02-01 | End: 2024-02-07 | Stop reason: HOSPADM

## 2024-02-01 RX ORDER — INSULIN LISPRO 100 [IU]/ML
0-5 INJECTION, SOLUTION INTRAVENOUS; SUBCUTANEOUS
Status: DISCONTINUED | OUTPATIENT
Start: 2024-02-01 | End: 2024-02-01

## 2024-02-01 RX ORDER — DEXTROSE MONOHYDRATE 100 MG/ML
0.3 INJECTION, SOLUTION INTRAVENOUS ONCE AS NEEDED
Status: DISCONTINUED | OUTPATIENT
Start: 2024-02-01 | End: 2024-02-07 | Stop reason: HOSPADM

## 2024-02-01 RX ADMIN — PRENATAL VIT W/ FE FUMARATE-FA TAB 27-0.8 MG 1 TABLET: 27-0.8 TAB at 08:01

## 2024-02-01 RX ADMIN — INSULIN LISPRO 3 UNITS: 100 INJECTION, SOLUTION INTRAVENOUS; SUBCUTANEOUS at 09:30

## 2024-02-01 RX ADMIN — INSULIN LISPRO 1 UNITS: 100 INJECTION, SOLUTION INTRAVENOUS; SUBCUTANEOUS at 14:49

## 2024-02-01 RX ADMIN — INSULIN LISPRO 1 UNITS: 100 INJECTION, SOLUTION INTRAVENOUS; SUBCUTANEOUS at 21:03

## 2024-02-01 RX ADMIN — BETAMETHASONE ACETATE AND BETAMETHASONE SODIUM PHOSPHATE 12 MG: 3; 3 INJECTION, SUSPENSION INTRA-ARTICULAR; INTRALESIONAL; INTRAMUSCULAR; SOFT TISSUE at 17:37

## 2024-02-01 RX ADMIN — TETANUS TOXOID, REDUCED DIPHTHERIA TOXOID AND ACELLULAR PERTUSSIS VACCINE, ADSORBED 0.5 ML: 5; 2.5; 8; 8; 2.5 SUSPENSION INTRAMUSCULAR at 14:36

## 2024-02-01 ASSESSMENT — PAIN SCALES - GENERAL
PAINLEVEL_OUTOF10: 0 - NO PAIN

## 2024-02-01 ASSESSMENT — PAIN - FUNCTIONAL ASSESSMENT
PAIN_FUNCTIONAL_ASSESSMENT: 0-10
PAIN_FUNCTIONAL_ASSESSMENT: 0-10

## 2024-02-01 NOTE — PROGRESS NOTES
"ANTEPARTUM PROGRESS NOTE   2024, 7:03 AM     SUBJECTIVE: No acute events overnight. Patient has no complaints this morning. Reports very little brown spotting. Denies painful contractions, VB, LOF. Reports normal fetal movement.     OBJECTIVE:   /80 (BP Location: Right arm, Patient Position: Sitting)   Pulse 86   Temp 36.7 °C (98.1 °F) (Temporal)   Resp 18   Ht 1.727 m (5' 7.99\")   Wt 106 kg (232 lb 12.9 oz)   LMP  (LMP Unknown)   SpO2 96%   BMI 35.41 kg/m²    Temp  Min: 36.5 °C (97.7 °F)  Max: 37 °C (98.6 °F)  Pulse  Min: 74  Max: 99  BP  Min: 99/62  Max: 134/80    General:  AAOx3, No acute distress  Cardiovascular: Warm and well perfused  Respiratory: Normal respiratory effort   Abdominal:  Soft, gravid, non-tender, no rebound or guarding  Extremities: Warm, well perfused       NST: Baseline 135/ mod variability + accels/ - decels  [place]: quiet     Labs:   Lab Results   Component Value Date    WBC 8.5 2024    HGB 10.4 (L) 2024    HCT 31.2 (L) 2024     2024     Lab Results   Component Value Date    APTT 26 (L) 2024    PROTIME 10.9 2024    INR 1.0 2024     Lab Results   Component Value Date    FIBRINOGEN 431 (H) 2024       ASSESSMENT AND PLAN:     38 y.o.  at 27w1d by LMP c/w 7 wk U/S with vaginal bleeding.    VB, likely placental abruption   - On admission, SSE with ~5cc of dark red blood in vagina and from cervix. Cervix visually closed.   - Rh positive  - CBC Hgb at 10.4 (last 11.5 on 10/30). Coags and fibrinogen wnl  - SSUS today     Hx of PEC  - Normotensive on presentation  - baseline HELLP labs nl, baseline P:C 0.08  - On bbASA     Elevated Glucose Levels  - 1hr screen - 145  - pending 3 hour confirmation test - will be done outpatient  - SSI in s/o BMZ     Fetal status: Reassuring, NST AGA   - continue daily NSTs while inpatient   - Ultrasound ():  (53%), cephalic   - BMZ , for #2 today     Routine:  - GBS: In " progress  - Flu & TDAP: Pt reports done with primary  - 1hr: 145 done on admission, pending 3 hour confirmation  - BCM: wants ppMirena      Dispo: Recommend continued inpatient care till bleeding has stopped for at least 1-2 days or is dark/old blood.     Pt seen and discussed with [facility] Attending, Dr. Alejandro.     Trice Rojo MD   [facility]  Pager [phone]     Principal Problem:    Placental abruption, antepartum, unspecified trimester  Active Problems:    History of  delivery, currently pregnant

## 2024-02-01 NOTE — CARE PLAN
The patient's goals for the shift include      The clinical goals for the shift include Receive beta dose 2 this afternoon and not have any more active vb  Pt without any active vb over the past shift. Denies cramps or ctx's. FHR reassuring and FM present. PP bs's have been elevated and pt has needed corrrective scale lispro insulin coverage today. Plan is for beta dose #2 to be given later today at 1730 and observe pt's bs's. Continue to monitor. Labs stable today.

## 2024-02-01 NOTE — CARE PLAN
Pre-Op call completed. Medications list reviewed and updated as needed.  Instructed patient to hold all medications am of surgery.      Patient instructed to arrive for surgery at 0700 on 8/12/22 at [facility].    Pre-op instructions reviewed, verbalized understanding.  Questions answered.  Call back number of [phone] given in case other concerns or questions arise.    Patient instructed to bring in current list of medications (name of medication, dose and frequency of daily use) day of surgery.     Pre-op Teaching Completed:    OR - Procedure, OR - Time and time of arrival, Location of Registration, NPO, Medications to take Day of Surgery, Skin Prep, Equipment to bring day of surgery and Discharge Policy: Ride/Responsibile Adult      Patient instructed to notify surgeon if patient has or develops any fever, cold or flu like symptoms, other signs of general illness, or any exposure to COVID19.  Patient also notified of current hospital visitor restrictions and hospital entrance screening.    PATIENT AWARE/NOTIFIED OF COVID TESTING TO BE COMPLETED PRIOR TO SURGERY.  PATIENT INSTRUCTED TO SELF QUARANTINE FROM TIME OF TESTING UNTIL SURGERY, IF PATIENT IS UNABLE TO QUARANTINE PATIENT MUST CONTINUE TO WEAR A MASK, PRACTICE SOCIAL DISTANCING AND PERFORM GOOD HAND HYGIENE.  Patient also notified if test not completed, surgery will need to be rescheduled.       The patient's goals for the shift include not to have any further VB    The clinical goals for the shift include remain free of bright red VB

## 2024-02-02 LAB
APTT PPP: 24 SECONDS (ref 27–38)
ERYTHROCYTE [DISTWIDTH] IN BLOOD BY AUTOMATED COUNT: 12.7 % (ref 11.5–14.5)
FIBRINOGEN PPP-MCNC: 425 MG/DL (ref 200–400)
GLUCOSE BLD MANUAL STRIP-MCNC: 135 MG/DL (ref 74–99)
GLUCOSE BLD MANUAL STRIP-MCNC: 150 MG/DL (ref 74–99)
GLUCOSE BLD MANUAL STRIP-MCNC: 151 MG/DL (ref 74–99)
GLUCOSE BLD MANUAL STRIP-MCNC: 226 MG/DL (ref 74–99)
HCT VFR BLD AUTO: 33.7 % (ref 36–46)
HGB BLD-MCNC: 11.1 G/DL (ref 12–16)
INR PPP: 1 (ref 0.9–1.1)
MCH RBC QN AUTO: 32.1 PG (ref 26–34)
MCHC RBC AUTO-ENTMCNC: 32.9 G/DL (ref 32–36)
MCV RBC AUTO: 97 FL (ref 80–100)
NRBC BLD-RTO: 0 /100 WBCS (ref 0–0)
PLATELET # BLD AUTO: 299 X10*3/UL (ref 150–450)
PROTHROMBIN TIME: 11 SECONDS (ref 9.8–12.8)
RBC # BLD AUTO: 3.46 X10*6/UL (ref 4–5.2)
WBC # BLD AUTO: 17.6 X10*3/UL (ref 4.4–11.3)

## 2024-02-02 PROCEDURE — 82947 ASSAY GLUCOSE BLOOD QUANT: CPT

## 2024-02-02 PROCEDURE — 1220000001 HC OB SEMI-PRIVATE ROOM DAILY

## 2024-02-02 PROCEDURE — 2500000001 HC RX 250 WO HCPCS SELF ADMINISTERED DRUGS (ALT 637 FOR MEDICARE OP): Performed by: STUDENT IN AN ORGANIZED HEALTH CARE EDUCATION/TRAINING PROGRAM

## 2024-02-02 PROCEDURE — 85730 THROMBOPLASTIN TIME PARTIAL: CPT | Performed by: STUDENT IN AN ORGANIZED HEALTH CARE EDUCATION/TRAINING PROGRAM

## 2024-02-02 PROCEDURE — 2500000002 HC RX 250 W HCPCS SELF ADMINISTERED DRUGS (ALT 637 FOR MEDICARE OP, ALT 636 FOR OP/ED)

## 2024-02-02 PROCEDURE — 99232 SBSQ HOSP IP/OBS MODERATE 35: CPT

## 2024-02-02 PROCEDURE — 59025 FETAL NON-STRESS TEST: CPT | Mod: GC

## 2024-02-02 PROCEDURE — 85384 FIBRINOGEN ACTIVITY: CPT | Performed by: STUDENT IN AN ORGANIZED HEALTH CARE EDUCATION/TRAINING PROGRAM

## 2024-02-02 PROCEDURE — 85027 COMPLETE CBC AUTOMATED: CPT | Performed by: STUDENT IN AN ORGANIZED HEALTH CARE EDUCATION/TRAINING PROGRAM

## 2024-02-02 PROCEDURE — 36415 COLL VENOUS BLD VENIPUNCTURE: CPT | Performed by: STUDENT IN AN ORGANIZED HEALTH CARE EDUCATION/TRAINING PROGRAM

## 2024-02-02 PROCEDURE — 59025 FETAL NON-STRESS TEST: CPT

## 2024-02-02 RX ORDER — DEXTROSE 40 %
15 GEL (GRAM) ORAL
Status: DISCONTINUED | OUTPATIENT
Start: 2024-02-02 | End: 2024-02-07 | Stop reason: HOSPADM

## 2024-02-02 RX ORDER — INSULIN GLARGINE 100 [IU]/ML
6 INJECTION, SOLUTION SUBCUTANEOUS
OUTPATIENT
Start: 2024-02-03

## 2024-02-02 RX ORDER — DEXTROSE 50 % IN WATER (D50W) INTRAVENOUS SYRINGE
25
Status: DISCONTINUED | OUTPATIENT
Start: 2024-02-02 | End: 2024-02-07 | Stop reason: HOSPADM

## 2024-02-02 RX ORDER — INSULIN GLARGINE 100 [IU]/ML
6 INJECTION, SOLUTION SUBCUTANEOUS
Status: DISCONTINUED | OUTPATIENT
Start: 2024-02-03 | End: 2024-02-04

## 2024-02-02 RX ORDER — INSULIN GLARGINE 100 [IU]/ML
6 INJECTION, SOLUTION SUBCUTANEOUS NIGHTLY
Status: DISCONTINUED | OUTPATIENT
Start: 2024-02-02 | End: 2024-02-04

## 2024-02-02 RX ORDER — INSULIN GLARGINE 100 [IU]/ML
INJECTION, SOLUTION SUBCUTANEOUS
Qty: 5 EACH | Refills: 3 | OUTPATIENT
Start: 2024-02-02

## 2024-02-02 RX ORDER — DEXTROSE 40 %
30 GEL (GRAM) ORAL
Status: DISCONTINUED | OUTPATIENT
Start: 2024-02-02 | End: 2024-02-07 | Stop reason: HOSPADM

## 2024-02-02 RX ADMIN — INSULIN LISPRO 1 UNITS: 100 INJECTION, SOLUTION INTRAVENOUS; SUBCUTANEOUS at 15:21

## 2024-02-02 RX ADMIN — INSULIN GLARGINE 6 UNITS: 100 INJECTION, SOLUTION SUBCUTANEOUS at 21:17

## 2024-02-02 RX ADMIN — PRENATAL VIT W/ FE FUMARATE-FA TAB 27-0.8 MG 1 TABLET: 27-0.8 TAB at 09:12

## 2024-02-02 RX ADMIN — INSULIN LISPRO 2 UNITS: 100 INJECTION, SOLUTION INTRAVENOUS; SUBCUTANEOUS at 09:12

## 2024-02-02 ASSESSMENT — PAIN - FUNCTIONAL ASSESSMENT
PAIN_FUNCTIONAL_ASSESSMENT: 0-10
PAIN_FUNCTIONAL_ASSESSMENT: 0-10

## 2024-02-02 ASSESSMENT — PAIN SCALES - GENERAL
PAINLEVEL_OUTOF10: 0 - NO PAIN
PAINLEVEL_OUTOF10: 0 - NO PAIN

## 2024-02-02 NOTE — PROGRESS NOTES
"ANTEPARTUM PROGRESS NOTE   2024, 7:52 AM     SUBJECTIVE: No acute events overnight. Patient has no complaints this morning.   Reports some bright red spotting very early last night, around 7:30pm. Had one more episode of very scant red spotting a few hours later. None since.   Denies painful contractions, VB, LOF. Reports normal fetal movement.     OBJECTIVE:   /66 (BP Location: Left arm, Patient Position: Lying)   Pulse 90   Temp 37.1 °C (98.8 °F) (Temporal)   Resp 18   Ht 1.727 m (5' 7.99\")   Wt 106 kg (232 lb 12.9 oz)   LMP  (LMP Unknown)   SpO2 (!) 95%   BMI 35.41 kg/m²    Temp  Min: 36.3 °C (97.3 °F)  Max: 37.1 °C (98.8 °F)  Pulse  Min: 77  Max: 97  BP  Min: 103/66  Max: 119/78    General:  AAOx3, No acute distress  Cardiovascular: Warm and well perfused  Respiratory: Normal respiratory effort   Abdominal:  Soft, gravid, mild tenderness in RU fundal area, baseline for her. no rebound or guarding  Extremities: Warm, well perfused     NST: Baseline 135/ mod variability + accels/ - decels  [place]: quiet     Labs:   Lab Results   Component Value Date    WBC 17.6 (H) 2024    HGB 11.1 (L) 2024    HCT 33.7 (L) 2024     2024     Lab Results   Component Value Date    APTT 24 (L) 2024    PROTIME 11.0 2024    INR 1.0 2024     Lab Results   Component Value Date    FIBRINOGEN 425 (H) 2024       ASSESSMENT AND PLAN:     38 y.o.  at 27w2d by LMP c/w 7 wk U/S with vaginal bleeding.    Placental abruption confirmed by US  - On admission, SSE with ~5cc of dark red blood in vagina and from cervix. Cervix visually closed.   - Rh positive  - CBC Hgb at 10.4 (last 11.5 on 10/30). Coags and fibrinogen wnl  - SSUS  showing abruption, no report yet in EMR  - Small amount of red spotting overnight after no bleeding 24hrs     Hx of PEC  - Normotensive this admission  - Baseline HELLP labs wnl, baseline P:C 0.08  - On bASA     Elevated Glucose Levels  - 1hr " screen - 145  - Will need 3-hour confirmation test - to be done outpatient  - SSI in s/o BMZ     Fetal status: Reassuring, NST AGA   - Daily NSTs while inpatient   - Ultrasound ():  (53%), cephalic  - BMZ -     Routine:  - GBS: In progress   - Flu & TDAP: Pt reports done with primary  - 1hr: 145 done on admission, pending 3 hour confirmation outpatient  - BCM: wants ppMirena      Dispo: Continue admission at least 1 more day for monitoring.      Pt seen and discussed with [facility] Attending, Dr. Alejandro.     Erika Mejía MD   [facility]  Pager [phone]     Principal Problem:    Placental abruption, antepartum, unspecified trimester  Active Problems:    History of  delivery, currently pregnant

## 2024-02-02 NOTE — CARE PLAN
Problem: Antepartum  Goal: Maintain pregnancy as long as maternal and/or fetal condition is stable  Outcome: Progressing     Problem: UH VAGINAL BLEEDING/HEMORRHAGE AP  Goal: No s/sx of hemorrhage  Outcome: Progressing   The patient's goals for the shift include remain pregnant as long as possible    The clinical goals for the shift include resolved VB    Over the shift, the patient did make progress toward the following goals.

## 2024-02-02 NOTE — HOSPITAL COURSE
38 y.o.  at ***wks***d by  by LMP c/w 7 week US who was admitted for placental abruption.     On , she was admitted with dark red vaginal bleeding. At the time she was asymptomatic, and  labor was ruled out with reassuring physical exam and observation. Initial lab work was notable for a Hgb of 10.5 with normal coagulation screen and fibrinogen. From - she received a full course of BMZ for fetal lung maturity.   On , formal special studies US notable for placental abruption. She had scant brown spotting that eventually stopped. Overnight, she noted scant bright red spotting that self-resolved by 2/2 AM. She was observed until ***, when she was discharged. Her hgb, coagulation screen, and fibrinogen remained stable throughout her admission. Fetal status was stable and reassuring throughout.     She also received tdap vaccination as part of her routine prenatal care.  Additionally, she was initiated on Lantus  due to significant increases in her blood glucose in the setting of BMZ administration. She will need a 3-hour OGTT outpatient for DM evaluation.

## 2024-02-03 LAB
ABO GROUP (TYPE) IN BLOOD: NORMAL
ANTIBODY SCREEN: NORMAL
APTT PPP: 23 SECONDS (ref 27–38)
APTT PPP: 23 SECONDS (ref 27–38)
ERYTHROCYTE [DISTWIDTH] IN BLOOD BY AUTOMATED COUNT: 12.7 % (ref 11.5–14.5)
ERYTHROCYTE [DISTWIDTH] IN BLOOD BY AUTOMATED COUNT: 12.7 % (ref 11.5–14.5)
FIBRINOGEN PPP-MCNC: 312 MG/DL (ref 200–400)
FIBRINOGEN PPP-MCNC: 316 MG/DL (ref 200–400)
GLUCOSE 1H P 100 G GLC PO SERPL-MCNC: 102 MG/DL
GLUCOSE BLD MANUAL STRIP-MCNC: 115 MG/DL (ref 74–99)
GLUCOSE BLD MANUAL STRIP-MCNC: 137 MG/DL (ref 74–99)
GLUCOSE BLD MANUAL STRIP-MCNC: 151 MG/DL (ref 74–99)
GLUCOSE BLD MANUAL STRIP-MCNC: 94 MG/DL (ref 74–99)
GP B STREP GENITAL QL CULT: ABNORMAL
HCT VFR BLD AUTO: 31.2 % (ref 36–46)
HCT VFR BLD AUTO: 32.3 % (ref 36–46)
HGB BLD-MCNC: 10.2 G/DL (ref 12–16)
HGB BLD-MCNC: 10.9 G/DL (ref 12–16)
INR PPP: 0.9 (ref 0.9–1.1)
INR PPP: 1 (ref 0.9–1.1)
MCH RBC QN AUTO: 32.2 PG (ref 26–34)
MCH RBC QN AUTO: 33.2 PG (ref 26–34)
MCHC RBC AUTO-ENTMCNC: 32.7 G/DL (ref 32–36)
MCHC RBC AUTO-ENTMCNC: 33.7 G/DL (ref 32–36)
MCV RBC AUTO: 98 FL (ref 80–100)
MCV RBC AUTO: 99 FL (ref 80–100)
NRBC BLD-RTO: 0 /100 WBCS (ref 0–0)
NRBC BLD-RTO: 0.2 /100 WBCS (ref 0–0)
PLATELET # BLD AUTO: 284 X10*3/UL (ref 150–450)
PLATELET # BLD AUTO: 288 X10*3/UL (ref 150–450)
PROTHROMBIN TIME: 10.4 SECONDS (ref 9.8–12.8)
PROTHROMBIN TIME: 10.9 SECONDS (ref 9.8–12.8)
RBC # BLD AUTO: 3.17 X10*6/UL (ref 4–5.2)
RBC # BLD AUTO: 3.28 X10*6/UL (ref 4–5.2)
RH FACTOR (ANTIGEN D): NORMAL
WBC # BLD AUTO: 13.3 X10*3/UL (ref 4.4–11.3)
WBC # BLD AUTO: 15.2 X10*3/UL (ref 4.4–11.3)

## 2024-02-03 PROCEDURE — 86920 COMPATIBILITY TEST SPIN: CPT

## 2024-02-03 PROCEDURE — 86901 BLOOD TYPING SEROLOGIC RH(D): CPT

## 2024-02-03 PROCEDURE — 82947 ASSAY GLUCOSE BLOOD QUANT: CPT

## 2024-02-03 PROCEDURE — 99233 SBSQ HOSP IP/OBS HIGH 50: CPT

## 2024-02-03 PROCEDURE — 36415 COLL VENOUS BLD VENIPUNCTURE: CPT

## 2024-02-03 PROCEDURE — 1220000001 HC OB SEMI-PRIVATE ROOM DAILY

## 2024-02-03 PROCEDURE — 85610 PROTHROMBIN TIME: CPT

## 2024-02-03 PROCEDURE — 85384 FIBRINOGEN ACTIVITY: CPT

## 2024-02-03 PROCEDURE — 85027 COMPLETE CBC AUTOMATED: CPT

## 2024-02-03 PROCEDURE — 2500000002 HC RX 250 W HCPCS SELF ADMINISTERED DRUGS (ALT 637 FOR MEDICARE OP, ALT 636 FOR OP/ED): Performed by: STUDENT IN AN ORGANIZED HEALTH CARE EDUCATION/TRAINING PROGRAM

## 2024-02-03 PROCEDURE — 59025 FETAL NON-STRESS TEST: CPT | Mod: GC

## 2024-02-03 PROCEDURE — 2500000004 HC RX 250 GENERAL PHARMACY W/ HCPCS (ALT 636 FOR OP/ED): Performed by: STUDENT IN AN ORGANIZED HEALTH CARE EDUCATION/TRAINING PROGRAM

## 2024-02-03 PROCEDURE — 82950 GLUCOSE TEST: CPT

## 2024-02-03 PROCEDURE — 59025 FETAL NON-STRESS TEST: CPT

## 2024-02-03 PROCEDURE — 2500000001 HC RX 250 WO HCPCS SELF ADMINISTERED DRUGS (ALT 637 FOR MEDICARE OP): Performed by: STUDENT IN AN ORGANIZED HEALTH CARE EDUCATION/TRAINING PROGRAM

## 2024-02-03 PROCEDURE — 2500000002 HC RX 250 W HCPCS SELF ADMINISTERED DRUGS (ALT 637 FOR MEDICARE OP, ALT 636 FOR OP/ED)

## 2024-02-03 RX ADMIN — INSULIN LISPRO 1 UNITS: 100 INJECTION, SOLUTION INTRAVENOUS; SUBCUTANEOUS at 14:41

## 2024-02-03 RX ADMIN — SODIUM CHLORIDE, POTASSIUM CHLORIDE, SODIUM LACTATE AND CALCIUM CHLORIDE 125 ML/HR: 600; 310; 30; 20 INJECTION, SOLUTION INTRAVENOUS at 06:36

## 2024-02-03 RX ADMIN — PRENATAL VIT W/ FE FUMARATE-FA TAB 27-0.8 MG 1 TABLET: 27-0.8 TAB at 09:40

## 2024-02-03 RX ADMIN — INSULIN GLARGINE 6 UNITS: 100 INJECTION, SOLUTION SUBCUTANEOUS at 21:09

## 2024-02-03 ASSESSMENT — PAIN SCALES - GENERAL
PAINLEVEL_OUTOF10: 0 - NO PAIN

## 2024-02-03 ASSESSMENT — PAIN - FUNCTIONAL ASSESSMENT
PAIN_FUNCTIONAL_ASSESSMENT: 0-10

## 2024-02-03 NOTE — SIGNIFICANT EVENT
Transfer to [facility]    38 y.o.  at 27w3d by LMP c/w 7 wk U/S with vaginal bleeding.     Placental abruption confirmed by US  - On admission, SSE with ~5cc of dark red blood in vagina and from cervix. Cervix visually closed.   - Rh positive  - CBC Hgb at 10.4 (last 11.5 on 10/30). Coags and fibrinogen wnl  - SSUS  showing abruption.  - Patient transferred down to L&D for prolonged monitoring in the setting of prolonged deceleration on AM NST. Remote late decels with contractions.   - Repeat CBC showing drop to 10.2 from 11.1. Coags wnl, Fibrinogen downtrended to 316. Bedside US equivocal for expanding abruption.  - 4pm repeat labs stable from previous.    Fetal status:   - CEFM on L&D mostly AGA. Periods of non-reassuring for prolonged/late decels. Now reassuring.   - Daily NSTs while inpatient   - Ultrasound ():  (53%), cephalic  - BMZ -    Patient stable and appropriate for transfer to [facility].    Erika Mejía MD PGY-1 OB/GYN

## 2024-02-03 NOTE — PROGRESS NOTES
"ANTEPARTUM PROGRESS NOTE   2/3/2024, 2:57 PM     SUBJECTIVE: No acute events overnight. Reports some bright red spotting this AM  Reports some brown spotting overnight   Denies painful contractions, VB, LOF. Reports normal fetal movement.     OBJECTIVE:   /72   Pulse 102   Temp 37 °C (98.6 °F) (Temporal)   Resp 18   Ht 1.727 m (5' 7.99\")   Wt 106 kg (232 lb 12.9 oz)   LMP  (LMP Unknown)   SpO2 96%   BMI 35.41 kg/m²    Temp  Min: 36.4 °C (97.5 °F)  Max: 37.1 °C (98.8 °F)  Pulse  Min: 68  Max: 114  BP  Min: 98/57  Max: 129/72    General:  AAOx3, No acute distress  Cardiovascular: Warm and well perfused  Respiratory: Normal respiratory effort   Abdominal:  Soft, gravid, mild tenderness in RU fundal area, baseline for her. no rebound or guarding  Extremities: Warm, well perfused     NST: Baseline 150/ mod variability + accels/ - decels  [place]: quiet     Labs:   Lab Results   Component Value Date    WBC 15.2 (H) 2024    HGB 10.2 (L) 2024    HCT 31.2 (L) 2024     2024     Lab Results   Component Value Date    APTT 23 (L) 2024    PROTIME 10.4 2024    INR 0.9 2024     Lab Results   Component Value Date    FIBRINOGEN 316 2024       ASSESSMENT AND PLAN:     38 y.o.  at 27w3d by LMP c/w 7 wk U/S with vaginal bleeding.    Placental abruption confirmed by US  - On admission, SSE with ~5cc of dark red blood in vagina and from cervix. Cervix visually closed.   - Rh positive  - CBC Hgb at 10.4 (last 11.5 on 10/30). Coags and fibrinogen wnl  - SSUS  showing abruption.  - Patient transferred down to L&D for prolonged monitoring in the setting of prolonged deceleration on AM NST. Remote late decels with contractions.   - Repeat CBC showing drop to 10.2 from 11.1. Coags wnl, Fibrinogen downtrended to 316. Bedside US not concerning for expanding abruption.      Hx of PEC  - Normotensive this admission  - Baseline HELLP labs wnl, baseline P:C 0.08  - On bASA   "   Elevated Glucose Levels  - 1hr screen - 145  - Will need 3-hour confirmation test - to be done outpatient  - Lantus , SSI in s/o BMZ    Fetal status: Reassuring, NST AGA   - Daily NSTs while inpatient   - Ultrasound ():  (53%), cephalic  - BMZ -     Routine:  - GBS: pos   - Flu & TDAP: Pt reports done with primary  - 1hr: 145 done on admission, pending 3 hour confirmation outpatient  - BCM: wants ppMirena      Dispo: Continue admission at least 1 more day for monitoring.      Pt seen and discussed with [facility] Attending, Dr. Alejandro.     Erika Mejía MD   [facility]  Pager [phone]     Principal Problem:    Placental abruption, antepartum, unspecified trimester  Active Problems:    History of  delivery, currently pregnant

## 2024-02-03 NOTE — NURSING NOTE
N.P. RN note 7-7p 2/3/24    11:22 AM  Mane discussing POC with patient at bedside. Patient to receive additional blood work this afternoon to compare values and assess bleeding.  MD's performing US at this time.  Pt to eat lunch and remain NPO until blood work is resulted.    11:45 AM  Patient placing order

## 2024-02-03 NOTE — CARE PLAN
The patient's goals for the shift include remain pregnant    The clinical goals for the shift include remain free of bright red bleeding      Problem: Antepartum  Goal: Maintain pregnancy as long as maternal and/or fetal condition is stable  Outcome: Progressing  Goal: Avoid/minimize constipation  Outcome: Progressing  Goal: No decrease in circulation/VTE  Outcome: Progressing  Goal: FHR remains reassuring  Outcome: Progressing  Goal: Minimize anxiety/maximize coping  Outcome: Progressing     Problem: UH VAGINAL BLEEDING/HEMORRHAGE AP  Goal: Fewer then 4-6 ct per hour  Outcome: Progressing  Goal: No s/sx of hemorrhage  Outcome: Progressing

## 2024-02-04 PROBLEM — O24.414 INSULIN CONTROLLED GESTATIONAL DIABETES MELLITUS (GDM) IN SECOND TRIMESTER (HHS-HCC): Status: ACTIVE | Noted: 2024-02-04

## 2024-02-04 LAB
ERYTHROCYTE [DISTWIDTH] IN BLOOD BY AUTOMATED COUNT: 12.9 % (ref 11.5–14.5)
GLUCOSE BLD MANUAL STRIP-MCNC: 128 MG/DL (ref 74–99)
GLUCOSE BLD MANUAL STRIP-MCNC: 131 MG/DL (ref 74–99)
GLUCOSE BLD MANUAL STRIP-MCNC: 170 MG/DL (ref 74–99)
GLUCOSE BLD MANUAL STRIP-MCNC: 99 MG/DL (ref 74–99)
HCT VFR BLD AUTO: 31.9 % (ref 36–46)
HGB BLD-MCNC: 10.5 G/DL (ref 12–16)
MCH RBC QN AUTO: 32.1 PG (ref 26–34)
MCHC RBC AUTO-ENTMCNC: 32.9 G/DL (ref 32–36)
MCV RBC AUTO: 98 FL (ref 80–100)
NRBC BLD-RTO: 0.2 /100 WBCS (ref 0–0)
PLATELET # BLD AUTO: 299 X10*3/UL (ref 150–450)
RBC # BLD AUTO: 3.27 X10*6/UL (ref 4–5.2)
WBC # BLD AUTO: 12.4 X10*3/UL (ref 4.4–11.3)

## 2024-02-04 PROCEDURE — 2500000001 HC RX 250 WO HCPCS SELF ADMINISTERED DRUGS (ALT 637 FOR MEDICARE OP): Performed by: STUDENT IN AN ORGANIZED HEALTH CARE EDUCATION/TRAINING PROGRAM

## 2024-02-04 PROCEDURE — 59025 FETAL NON-STRESS TEST: CPT

## 2024-02-04 PROCEDURE — 85027 COMPLETE CBC AUTOMATED: CPT | Performed by: STUDENT IN AN ORGANIZED HEALTH CARE EDUCATION/TRAINING PROGRAM

## 2024-02-04 PROCEDURE — 2500000002 HC RX 250 W HCPCS SELF ADMINISTERED DRUGS (ALT 637 FOR MEDICARE OP, ALT 636 FOR OP/ED)

## 2024-02-04 PROCEDURE — 99232 SBSQ HOSP IP/OBS MODERATE 35: CPT

## 2024-02-04 PROCEDURE — 59025 FETAL NON-STRESS TEST: CPT | Mod: GC

## 2024-02-04 PROCEDURE — 1220000001 HC OB SEMI-PRIVATE ROOM DAILY

## 2024-02-04 PROCEDURE — 36415 COLL VENOUS BLD VENIPUNCTURE: CPT | Performed by: STUDENT IN AN ORGANIZED HEALTH CARE EDUCATION/TRAINING PROGRAM

## 2024-02-04 PROCEDURE — 82947 ASSAY GLUCOSE BLOOD QUANT: CPT

## 2024-02-04 RX ORDER — DEXTROSE 50 % IN WATER (D50W) INTRAVENOUS SYRINGE
25
Status: DISCONTINUED | OUTPATIENT
Start: 2024-02-04 | End: 2024-02-04

## 2024-02-04 RX ORDER — DEXTROSE 40 %
30 GEL (GRAM) ORAL
Status: DISCONTINUED | OUTPATIENT
Start: 2024-02-04 | End: 2024-02-04

## 2024-02-04 RX ORDER — INSULIN LISPRO 100 [IU]/ML
4 INJECTION, SOLUTION INTRAVENOUS; SUBCUTANEOUS
Status: DISCONTINUED | OUTPATIENT
Start: 2024-02-04 | End: 2024-02-07 | Stop reason: HOSPADM

## 2024-02-04 RX ORDER — DEXTROSE 40 %
15 GEL (GRAM) ORAL
Status: DISCONTINUED | OUTPATIENT
Start: 2024-02-04 | End: 2024-02-04

## 2024-02-04 RX ORDER — INSULIN GLARGINE 100 [IU]/ML
8 INJECTION, SOLUTION SUBCUTANEOUS NIGHTLY
Status: DISCONTINUED | OUTPATIENT
Start: 2024-02-04 | End: 2024-02-07 | Stop reason: HOSPADM

## 2024-02-04 RX ORDER — INSULIN GLARGINE 100 [IU]/ML
8 INJECTION, SOLUTION SUBCUTANEOUS
Status: DISCONTINUED | OUTPATIENT
Start: 2024-02-05 | End: 2024-02-07 | Stop reason: HOSPADM

## 2024-02-04 RX ORDER — INSULIN GLARGINE 100 [IU]/ML
8 INJECTION, SOLUTION SUBCUTANEOUS NIGHTLY
Status: DISCONTINUED | OUTPATIENT
Start: 2024-02-05 | End: 2024-02-04

## 2024-02-04 RX ADMIN — INSULIN GLARGINE 6 UNITS: 100 INJECTION, SOLUTION SUBCUTANEOUS at 06:10

## 2024-02-04 RX ADMIN — INSULIN GLARGINE 8 UNITS: 100 INJECTION, SOLUTION SUBCUTANEOUS at 22:06

## 2024-02-04 RX ADMIN — INSULIN LISPRO 1 UNITS: 100 INJECTION, SOLUTION INTRAVENOUS; SUBCUTANEOUS at 09:16

## 2024-02-04 RX ADMIN — PRENATAL VIT W/ FE FUMARATE-FA TAB 27-0.8 MG 1 TABLET: 27-0.8 TAB at 08:50

## 2024-02-04 RX ADMIN — INSULIN LISPRO 4 UNITS: 100 INJECTION, SOLUTION INTRAVENOUS; SUBCUTANEOUS at 18:33

## 2024-02-04 ASSESSMENT — PAIN SCALES - GENERAL
PAINLEVEL_OUTOF10: 0 - NO PAIN

## 2024-02-04 ASSESSMENT — PAIN - FUNCTIONAL ASSESSMENT
PAIN_FUNCTIONAL_ASSESSMENT: 0-10

## 2024-02-04 NOTE — CARE PLAN
The patient's goals for the shift include to be discharged today    The clinical goals for the shift include no vaginal bleeding      Problem: Antepartum  Goal: Maintain pregnancy as long as maternal and/or fetal condition is stable  Outcome: Progressing  Goal: Avoid/minimize constipation  Outcome: Progressing  Goal: No decrease in circulation/VTE  Outcome: Progressing  Goal: FHR remains reassuring  Outcome: Progressing  Goal: Minimize anxiety/maximize coping  Outcome: Progressing     Problem: UH VAGINAL BLEEDING/HEMORRHAGE AP  Goal: Fewer then 4-6 ct per hour  Outcome: Progressing  Goal: No s/sx of hemorrhage  Outcome: Progressing     Problem: Discharge Planning  Goal: Discharge to home or other facility with appropriate resources  Outcome: Progressing

## 2024-02-04 NOTE — CARE PLAN
The patient's goals for the shift include stay pregnant    The clinical goals for the shift include FHR remains reassuring, remain free of bright red vaginal bleeding    VS stable overnight. No s/sx HDP. Pt denies ctx and LOF. +fetal movement and FHR WNL. Vaginal bleeding minimal w/ no pads to assess and only small pink spotting when wiping after urinating.

## 2024-02-04 NOTE — PROGRESS NOTES
"ANTEPARTUM PROGRESS NOTE   2024, 5:57 AM     SUBJECTIVE: No acute events overnight. Reports some bright red spotting this AM, only after using the restroom  Denies painful contractions, VB, LOF. Reports normal fetal movement.     OBJECTIVE:   /67   Pulse 91   Temp 36.4 °C (97.5 °F) (Temporal)   Resp 18   Ht 1.727 m (5' 7.99\")   Wt 106 kg (232 lb 12.9 oz)   LMP  (LMP Unknown)   SpO2 97%   BMI 35.41 kg/m²    Temp  Min: 36.3 °C (97.3 °F)  Max: 37 °C (98.6 °F)  Pulse  Min: 70  Max: 114  BP  Min: 98/57  Max: 129/75    General:  AAOx3, No acute distress  Cardiovascular: Warm and well perfused  Respiratory: Normal respiratory effort   Abdominal:  Soft, gravid, nontender to palpation. no rebound or guarding  Extremities: Warm, well perfused     NST: Baseline 150/ mod variability + accels/ - decels  [place]: quiet     Labs:   Lab Results   Component Value Date    WBC 13.3 (H) 2024    HGB 10.9 (L) 2024    HCT 32.3 (L) 2024     2024     Lab Results   Component Value Date    APTT 23 (L) 2024    PROTIME 10.9 2024    INR 1.0 2024     Lab Results   Component Value Date    FIBRINOGEN 312 2024       ASSESSMENT AND PLAN:     38 y.o.  at 27w4d by LMP c/w 7 wk U/S with vaginal bleeding.    Placental abruption confirmed by US  - On admission, SSE with ~5cc of dark red blood in vagina and from cervix. Cervix visually closed.   - Rh positive  - Last Hgb 10.9, coags wnl and fibrinogen 312 (2/3)  - SSUS  showing abruption.     Hx of PEC  - Normotensive this admission  - Baseline HELLP labs wnl, baseline P:C 0.08  - On bASA     Elevated Glucose Levels  - 1hr screen - 145  - Will need 3-hour confirmation test - to be done outpatient  - Lantus /, SSI in s/o BMZ  - Continues to have elevated blood sugars, Insulin regimen changed to (): Lantus , lispro     Fetal status: Reassuring, NST AGA   - Daily NSTs while inpatient   - Ultrasound ():  " (53%), cephalic  - BMZ -     Routine:  - GBS: pos   - Flu & TDAP: Pt reports done with primary  - 1hr: 145 done on admission, pending 3 hour confirmation outpatient  - BCM: stas Oneal      Dispo: Continue admission until clinical stability    Pt seen and discussed with [facility] Attending, Dr. Alejandro.     Evette Mason MD PGY-2  [facility]  Pager [phone]     Principal Problem:    Placental abruption, antepartum, unspecified trimester  Active Problems:    History of  delivery, currently pregnant    26 weeks gestation of pregnancy    Insulin controlled gestational diabetes mellitus (GDM) in second trimester

## 2024-02-05 LAB
APTT PPP: 23 SECONDS (ref 27–38)
ERYTHROCYTE [DISTWIDTH] IN BLOOD BY AUTOMATED COUNT: 12.9 % (ref 11.5–14.5)
FIBRINOGEN PPP-MCNC: 351 MG/DL (ref 200–400)
GLUCOSE BLD MANUAL STRIP-MCNC: 115 MG/DL (ref 74–99)
GLUCOSE BLD MANUAL STRIP-MCNC: 127 MG/DL (ref 74–99)
GLUCOSE BLD MANUAL STRIP-MCNC: 132 MG/DL (ref 74–99)
GLUCOSE BLD MANUAL STRIP-MCNC: 136 MG/DL (ref 74–99)
GLUCOSE BLD MANUAL STRIP-MCNC: 149 MG/DL (ref 74–99)
GLUCOSE BLD MANUAL STRIP-MCNC: 88 MG/DL (ref 74–99)
HCT VFR BLD AUTO: 30.7 % (ref 36–46)
HGB BLD-MCNC: 10.2 G/DL (ref 12–16)
INR PPP: 0.9 (ref 0.9–1.1)
MCH RBC QN AUTO: 32.7 PG (ref 26–34)
MCHC RBC AUTO-ENTMCNC: 33.2 G/DL (ref 32–36)
MCV RBC AUTO: 98 FL (ref 80–100)
NRBC BLD-RTO: 0.2 /100 WBCS (ref 0–0)
PLATELET # BLD AUTO: 282 X10*3/UL (ref 150–450)
PROTHROMBIN TIME: 10.6 SECONDS (ref 9.8–12.8)
RBC # BLD AUTO: 3.12 X10*6/UL (ref 4–5.2)
WBC # BLD AUTO: 10.3 X10*3/UL (ref 4.4–11.3)

## 2024-02-05 PROCEDURE — 59025 FETAL NON-STRESS TEST: CPT

## 2024-02-05 PROCEDURE — 2500000001 HC RX 250 WO HCPCS SELF ADMINISTERED DRUGS (ALT 637 FOR MEDICARE OP): Performed by: STUDENT IN AN ORGANIZED HEALTH CARE EDUCATION/TRAINING PROGRAM

## 2024-02-05 PROCEDURE — 36415 COLL VENOUS BLD VENIPUNCTURE: CPT

## 2024-02-05 PROCEDURE — 85027 COMPLETE CBC AUTOMATED: CPT

## 2024-02-05 PROCEDURE — 99233 SBSQ HOSP IP/OBS HIGH 50: CPT

## 2024-02-05 PROCEDURE — 59025 FETAL NON-STRESS TEST: CPT | Mod: GC

## 2024-02-05 PROCEDURE — 85610 PROTHROMBIN TIME: CPT

## 2024-02-05 PROCEDURE — 82947 ASSAY GLUCOSE BLOOD QUANT: CPT

## 2024-02-05 PROCEDURE — 1220000001 HC OB SEMI-PRIVATE ROOM DAILY

## 2024-02-05 PROCEDURE — 85384 FIBRINOGEN ACTIVITY: CPT

## 2024-02-05 RX ADMIN — INSULIN LISPRO 4 UNITS: 100 INJECTION, SOLUTION INTRAVENOUS; SUBCUTANEOUS at 07:28

## 2024-02-05 RX ADMIN — PRENATAL VIT W/ FE FUMARATE-FA TAB 27-0.8 MG 1 TABLET: 27-0.8 TAB at 08:12

## 2024-02-05 RX ADMIN — INSULIN LISPRO 4 UNITS: 100 INJECTION, SOLUTION INTRAVENOUS; SUBCUTANEOUS at 14:03

## 2024-02-05 RX ADMIN — INSULIN LISPRO 4 UNITS: 100 INJECTION, SOLUTION INTRAVENOUS; SUBCUTANEOUS at 18:01

## 2024-02-05 RX ADMIN — INSULIN GLARGINE 8 UNITS: 100 INJECTION, SOLUTION SUBCUTANEOUS at 21:46

## 2024-02-05 RX ADMIN — INSULIN GLARGINE 8 UNITS: 100 INJECTION, SOLUTION SUBCUTANEOUS at 07:31

## 2024-02-05 ASSESSMENT — PAIN - FUNCTIONAL ASSESSMENT
PAIN_FUNCTIONAL_ASSESSMENT: 0-10

## 2024-02-05 ASSESSMENT — PAIN SCALES - GENERAL
PAINLEVEL_OUTOF10: 0 - NO PAIN

## 2024-02-05 NOTE — CARE PLAN
The patient's goals for the shift include stay pregnant and rest    The clinical goals for the shift include Pt will not have any active vb and fhr will remain reassuring over the next 24 hours    Pt stable. FHR remains reassuring with morning nst aga. Pt endorses FM. Pt without any vb over the past shift. BS's remain stable on lispro w/meals and lantus in the morning and evening. Pt has not required corrective scale insulin post infection. Continue to monitor.

## 2024-02-05 NOTE — DISCHARGE INSTR - AVS FIRST PAGE
"Call your OB provider for contractions (tightening, \"balling up\") more than 4-6 times an hour; constant menstrual-like cramps in your lower belly; low, dull backache different from what you normally have; increased pressure or pain in your pelvis, lower belly, back or thighs; increased vaginal discharge or mucus-like watery or bloody discharge; red vaginal bleeding; leaking amniotic fluid (\"water breaks\"); chills or fever of 100.4 F or above; severe headache that does not go away; blurry vision; decreased baby movements; feeling that something is \"not right\"; feeling depressed or unable to cope    "

## 2024-02-05 NOTE — PROGRESS NOTES
"ANTEPARTUM PROGRESS NOTE   2024, 8:37 AM     SUBJECTIVE: No acute events overnight. Reports no vaginal bleeding or spotting since yesterday afternoon. Has occasional contractions.  Denies VB, LOF. Reports normal fetal movement.     OBJECTIVE:   /71   Pulse 79   Temp 36.1 °C (97 °F) (Temporal)   Resp 16   Ht 1.727 m (5' 7.99\")   Wt 106 kg (232 lb 12.9 oz)   LMP  (LMP Unknown)   SpO2 96%   BMI 35.41 kg/m²    Temp  Min: 36.1 °C (97 °F)  Max: 37.2 °C (99 °F)  Pulse  Min: 79  Max: 98  BP  Min: 106/66  Max: 122/76    General:  AAOx3, No acute distress  Cardiovascular: Warm and well perfused  Respiratory: Normal respiratory effort   Abdominal:  Soft, gravid, nontender to palpation. no rebound or guarding  Extremities: Warm, well perfused     NST: Baseline 150/ mod variability + accels/ - decels   [place]: quiet     Labs:   Lab Results   Component Value Date    WBC 10.3 2024    HGB 10.2 (L) 2024    HCT 30.7 (L) 2024     2024     Lab Results   Component Value Date    APTT 23 (L) 2024    PROTIME 10.6 2024    INR 0.9 2024     Lab Results   Component Value Date    FIBRINOGEN 351 2024       ASSESSMENT AND PLAN:     38 y.o.  at 27w5d by LMP c/w 7 wk U/S with vaginal bleeding.    Placental abruption confirmed by US  - On admission, SSE with ~5cc of dark red blood in vagina and from cervix. Cervix visually closed.   - Rh positive  - Last Hgb 10.9, coags wnl and fibrinogen 312 (2/3) -> repeat labs pending  - SSUS  showing 1.7 x 0.5 cm subchorionic hemorrhage      Hx of PEC  - Normotensive this admission  - Baseline HELLP labs wnl, baseline P:C 0.08  - On bASA     Elevated Glucose Levels  - 1hr screen - 145  - Will need 3-hour confirmation test - to be done outpatient  - Insulin regimen changed to (): Lantus , lispro   - Fasting 88 this AM, postprandials range 128 -170     Fetal status: Reassuring, NST AGA   - Daily NSTs while inpatient   - " Ultrasound ():  (53%), cephalic  - BMZ -     Routine:  - GBS: pos   - Flu & TDAP: Pt reports done with primary  - 1hr: 145 done on admission, pending 3 hour confirmation outpatient  - BCM: wants ppMirena      Dispo: Continue admission until clinical stability    Pt seen and discussed with [facility] Attending, Dr. Rihcard Mason MD PGY-2  [facility]  Pager [phone]     Principal Problem:    Placental abruption, antepartum, unspecified trimester  Active Problems:    History of  delivery, currently pregnant    26 weeks gestation of pregnancy    Insulin controlled gestational diabetes mellitus (GDM) in second trimester

## 2024-02-05 NOTE — PROGRESS NOTES
Social Work Assessment     Patient: Fiorella Leon, 37yo, , ANA CRISTINA 26  Address: [address] [place] [address]  Phone: [phone]    Referral Reason: assessment    Prenatal Care:  x 7 to date  Barriers: none    Other Children: 2 sons, ages 18 and 15    FOB: Ernesto Rosalie. SO    Household Composition:   Fiorella Leon - self  17yo son  14yo son (ACL surgery tomorrow)  Tylor Wiggins - FOB/SO  his son    Supports: Ms Leon reports her SO is her primary support    IPV/DV or Safety Concerns: Ms Leon denies concerns at this time    Transportation Concerns: none    Insurance: No insurance at this time, working with [facility] and has pending Medicaid    Substance Use History: mj prepregnancy per chart    Mental Health Diagnoses/Concerns: Ms Leon denies signs and symptoms of depression. She reports her mood is stable.    Assessment: SW met with Ms Leon for assessment. She was accepting and engaged. She reports she is preparing for delivery in the coming months and has good support. She is working with [facility] for insurance and denies additional concerns. SW reviewed SW role and available supports including parking and other  resources.     Plan: SW will remain available as needed, please message if concerns identified. Ms Leon clear from SW perspective.     Signature: NICKY Lam

## 2024-02-06 ENCOUNTER — APPOINTMENT (OUTPATIENT)
Dept: RADIOLOGY | Facility: CLINIC | Age: 39
End: 2024-02-06
Payer: MEDICAID

## 2024-02-06 LAB
BLOOD EXPIRATION DATE: NORMAL
DISPENSE STATUS: NORMAL
GLUCOSE BLD MANUAL STRIP-MCNC: 115 MG/DL (ref 74–99)
GLUCOSE BLD MANUAL STRIP-MCNC: 120 MG/DL (ref 74–99)
GLUCOSE BLD MANUAL STRIP-MCNC: 123 MG/DL (ref 74–99)
GLUCOSE BLD MANUAL STRIP-MCNC: 95 MG/DL (ref 74–99)
PRODUCT BLOOD TYPE: 6200
PRODUCT CODE: NORMAL
UNIT ABO: NORMAL
UNIT NUMBER: NORMAL
UNIT RH: NORMAL
UNIT VOLUME: 350
XM INTEP: NORMAL

## 2024-02-06 PROCEDURE — 59025 FETAL NON-STRESS TEST: CPT

## 2024-02-06 PROCEDURE — 59025 FETAL NON-STRESS TEST: CPT | Mod: GC

## 2024-02-06 PROCEDURE — 99232 SBSQ HOSP IP/OBS MODERATE 35: CPT

## 2024-02-06 PROCEDURE — 99232 SBSQ HOSP IP/OBS MODERATE 35: CPT | Performed by: STUDENT IN AN ORGANIZED HEALTH CARE EDUCATION/TRAINING PROGRAM

## 2024-02-06 PROCEDURE — 82947 ASSAY GLUCOSE BLOOD QUANT: CPT

## 2024-02-06 PROCEDURE — 1220000001 HC OB SEMI-PRIVATE ROOM DAILY

## 2024-02-06 PROCEDURE — 2500000001 HC RX 250 WO HCPCS SELF ADMINISTERED DRUGS (ALT 637 FOR MEDICARE OP): Performed by: STUDENT IN AN ORGANIZED HEALTH CARE EDUCATION/TRAINING PROGRAM

## 2024-02-06 RX ADMIN — INSULIN LISPRO 4 UNITS: 100 INJECTION, SOLUTION INTRAVENOUS; SUBCUTANEOUS at 19:56

## 2024-02-06 RX ADMIN — INSULIN LISPRO 4 UNITS: 100 INJECTION, SOLUTION INTRAVENOUS; SUBCUTANEOUS at 10:12

## 2024-02-06 RX ADMIN — INSULIN GLARGINE 8 UNITS: 100 INJECTION, SOLUTION SUBCUTANEOUS at 21:54

## 2024-02-06 RX ADMIN — INSULIN LISPRO 4 UNITS: 100 INJECTION, SOLUTION INTRAVENOUS; SUBCUTANEOUS at 15:54

## 2024-02-06 RX ADMIN — PRENATAL VIT W/ FE FUMARATE-FA TAB 27-0.8 MG 1 TABLET: 27-0.8 TAB at 09:20

## 2024-02-06 RX ADMIN — INSULIN GLARGINE 8 UNITS: 100 INJECTION, SOLUTION SUBCUTANEOUS at 09:20

## 2024-02-06 ASSESSMENT — PAIN SCALES - GENERAL
PAINLEVEL_OUTOF10: 0 - NO PAIN

## 2024-02-06 ASSESSMENT — PAIN - FUNCTIONAL ASSESSMENT
PAIN_FUNCTIONAL_ASSESSMENT: 0-10

## 2024-02-06 NOTE — PROGRESS NOTES
"ANTEPARTUM PROGRESS NOTE   2024, 8:05 AM     SUBJECTIVE: Endorsing rare contractions overnight. No LOF, VB or changes in FM.     OBJECTIVE:   /68   Pulse 86   Temp 36.6 °C (97.9 °F) (Temporal)   Resp 18   Ht 1.727 m (5' 7.99\")   Wt 106 kg (232 lb 12.9 oz)   LMP  (LMP Unknown)   SpO2 99%   BMI 35.41 kg/m²    Temp  Min: 36 °C (96.8 °F)  Max: 36.7 °C (98.1 °F)  Pulse  Min: 86  Max: 109  BP  Min: 104/68  Max: 135/75    General:  AAOx3, No acute distress  Cardiovascular: Warm and well perfused  Respiratory: Normal respiratory effort   Abdominal:  Soft, gravid, nontender to palpation. no rebound or guarding  Extremities: Warm, well perfused     NST: Baseline 150/ mod variability + accels/ - decels   [place]: quiet     Labs:   Lab Results   Component Value Date    WBC 10.3 2024    HGB 10.2 (L) 2024    HCT 30.7 (L) 2024     2024     Lab Results   Component Value Date    APTT 23 (L) 2024    PROTIME 10.6 2024    INR 0.9 2024     Lab Results   Component Value Date    FIBRINOGEN 351 2024     ASSESSMENT AND PLAN:     38 y.o.  at 27w6d by LMP c/w 7 wk U/S with vaginal bleeding    Placental abruption confirmed by US  - On admission, SSE with ~5cc of dark red blood in vagina and from cervix. Cervix visually closed  - Rh positive  - Last Hgb 10.2, coags wnl and fibrinogen 351 (10/5)    - SSUS  showing 1.7 x 0.5 cm subchorionic hemorrhage      Hx of PEC  - Normotensive this admission  - Baseline HELLP labs wnl, baseline P:C 0.08  - On bASA     Elevated Glucose Levels  - 1hr screen - 145  - Will need 3-hour confirmation test - to be done outpatient  - Insulin regimen changed to (): Lantus , lispro     Fetal status: decel on AM NST -> to L&D for prolonged monitoring    - Daily NSTs while inpatient   - Ultrasound ():  (53%), cephalic  - BMZ -     Routine:  - GBS: pos   - Flu & TDAP: Pt reports done with primary  - 1hr: 145 " done on admission, pending 3 hour confirmation outpatient  - BCM: stas Oneal      Dispo: Continue admission until clinical stability    Seen and discussed with Dr. Neil Guajardo MD     Principal Problem:    Placental abruption, antepartum, unspecified trimester  Active Problems:    History of  delivery, currently pregnant    26 weeks gestation of pregnancy    Insulin controlled gestational diabetes mellitus (GDM) in second trimester

## 2024-02-06 NOTE — CARE PLAN
The patient's goals for the shift include stay pregnant and rest    The clinical goals for the shift include Pt will not have any active vb and fhr will remain reassuring over the next 24 hours

## 2024-02-06 NOTE — PROGRESS NOTES
"ANTEPARTUM PROGRESS NOTE   2024, 11:42 AM     SUBJECTIVE: No acute events overnight. Reports no vaginal bleeding or spotting since 2/4 PM. Has occasional contractions.  Denies LOF. Reports normal fetal movement.     OBJECTIVE:   /73   Pulse 97   Temp 36.7 °C (98.1 °F) (Temporal)   Resp 18   Ht 1.727 m (5' 7.99\")   Wt 106 kg (232 lb 12.9 oz)   LMP  (LMP Unknown)   SpO2 96%   BMI 35.41 kg/m²    Temp  Min: 36.1 °C (97 °F)  Max: 36.8 °C (98.2 °F)  Pulse  Min: 86  Max: 109  BP  Min: 106/73  Max: 135/75    General:  AAOx3, No acute distress  Cardiovascular: Warm and well perfused  Respiratory: Normal respiratory effort   Abdominal:  Soft, gravid, nontender to palpation. no rebound or guarding  Extremities: Warm, well perfused     NST: Baseline 150/ mod variability + accels/ 1 late decel  [place]: Ctx x1    Labs:   Lab Results   Component Value Date    WBC 10.3 2024    HGB 10.2 (L) 2024    HCT 30.7 (L) 2024     2024     Lab Results   Component Value Date    APTT 23 (L) 2024    PROTIME 10.6 2024    INR 0.9 2024     Lab Results   Component Value Date    FIBRINOGEN 351 2024       ASSESSMENT AND PLAN:     38 y.o.  at 27w6d by LMP c/w 7 wk U/S with vaginal bleeding.    Placental abruption confirmed by US  - On admission, SSE with ~5cc of dark red blood in vagina and from cervix. Cervix visually closed.   - Rh positive  - Last Hgb 10.9, coags wnl and fibrinogen 312 (2/3) -> Hgb 10.5, coags wnl and fibrinogen 299  - SSUS  showing 1.7 x 0.5 cm subchorionic hemorrhage      Hx of PEC  - Normotensive this admission  - Baseline HELLP labs wnl, baseline P:C 0.08  - On bASA     Elevated Glucose Levels  - 1hr screen - 145  - Will need 3-hour confirmation test - to be done outpatient  - Insulin regimen changed to (): Lantus 8/8, lispro   - POCT glucose appropriate, will continue to monitor    Fetal status: : late decel on NST -> to L&D for cEFM  - " Daily NSTs while inpatient   - Ultrasound ():  (53%), cephalic  - BMZ -     Routine:  - GBS: pos   - Flu & TDAP: Pt reports done with primary  - 1hr: 145 done on admission, pending 3 hour confirmation outpatient  - BCM: stas Oneal      Dispo: Continue admission until clinical stability    Pt seen and discussed with M Attending, Dr. Carolyn Mason MD PGY-2  [facility]  Pager [phone]     Principal Problem:    Placental abruption, antepartum, unspecified trimester  Active Problems:    History of  delivery, currently pregnant    26 weeks gestation of pregnancy    Insulin controlled gestational diabetes mellitus (GDM) in second trimester

## 2024-02-07 ENCOUNTER — HOSPITAL ENCOUNTER (INPATIENT)
Facility: HOSPITAL | Age: 39
End: 2024-02-07
Attending: OBSTETRICS & GYNECOLOGY | Admitting: OBSTETRICS & GYNECOLOGY

## 2024-02-07 ENCOUNTER — APPOINTMENT (OUTPATIENT)
Dept: RADIOLOGY | Facility: HOSPITAL | Age: 39
End: 2024-02-07
Payer: COMMERCIAL

## 2024-02-07 VITALS
HEIGHT: 68 IN | RESPIRATION RATE: 18 BRPM | WEIGHT: 232.81 LBS | BODY MASS INDEX: 35.28 KG/M2 | HEART RATE: 97 BPM | DIASTOLIC BLOOD PRESSURE: 76 MMHG | TEMPERATURE: 97.7 F | SYSTOLIC BLOOD PRESSURE: 106 MMHG | OXYGEN SATURATION: 96 %

## 2024-02-07 LAB
GLUCOSE BLD MANUAL STRIP-MCNC: 137 MG/DL (ref 74–99)
GLUCOSE BLD MANUAL STRIP-MCNC: 99 MG/DL (ref 74–99)

## 2024-02-07 PROCEDURE — 59025 FETAL NON-STRESS TEST: CPT | Performed by: STUDENT IN AN ORGANIZED HEALTH CARE EDUCATION/TRAINING PROGRAM

## 2024-02-07 PROCEDURE — 82947 ASSAY GLUCOSE BLOOD QUANT: CPT

## 2024-02-07 PROCEDURE — 99239 HOSP IP/OBS DSCHRG MGMT >30: CPT | Performed by: STUDENT IN AN ORGANIZED HEALTH CARE EDUCATION/TRAINING PROGRAM

## 2024-02-07 PROCEDURE — 76818 FETAL BIOPHYS PROFILE W/NST: CPT

## 2024-02-07 PROCEDURE — 59025 FETAL NON-STRESS TEST: CPT | Mod: GC | Performed by: STUDENT IN AN ORGANIZED HEALTH CARE EDUCATION/TRAINING PROGRAM

## 2024-02-07 PROCEDURE — 76818 FETAL BIOPHYS PROFILE W/NST: CPT | Performed by: OBSTETRICS & GYNECOLOGY

## 2024-02-07 PROCEDURE — 2500000001 HC RX 250 WO HCPCS SELF ADMINISTERED DRUGS (ALT 637 FOR MEDICARE OP): Performed by: STUDENT IN AN ORGANIZED HEALTH CARE EDUCATION/TRAINING PROGRAM

## 2024-02-07 RX ORDER — BLOOD SUGAR DIAGNOSTIC
STRIP MISCELLANEOUS
Qty: 150 EACH | Refills: 3 | Status: SHIPPED | OUTPATIENT
Start: 2024-02-07 | End: 2024-03-29 | Stop reason: HOSPADM

## 2024-02-07 RX ORDER — ISOPROPYL ALCOHOL 70 ML/100ML
SWAB TOPICAL
Qty: 200 EACH | Refills: 3 | Status: SHIPPED | OUTPATIENT
Start: 2024-02-07 | End: 2024-04-08 | Stop reason: ALTCHOICE

## 2024-02-07 RX ORDER — DEXTROSE 4 G
TABLET,CHEWABLE ORAL
Qty: 1 EACH | Refills: 0 | Status: SHIPPED | OUTPATIENT
Start: 2024-02-07 | End: 2024-04-08 | Stop reason: ALTCHOICE

## 2024-02-07 RX ORDER — INSULIN LISPRO 100 [IU]/ML
INJECTION, SOLUTION INTRAVENOUS; SUBCUTANEOUS
Qty: 5 EACH | Refills: 3 | Status: SHIPPED | OUTPATIENT
Start: 2024-02-07 | End: 2024-03-29 | Stop reason: HOSPADM

## 2024-02-07 RX ORDER — INSULIN GLARGINE 100 [IU]/ML
INJECTION, SOLUTION SUBCUTANEOUS
Qty: 5 EACH | Refills: 3 | Status: SHIPPED | OUTPATIENT
Start: 2024-02-07 | End: 2024-03-29 | Stop reason: HOSPADM

## 2024-02-07 RX ORDER — PEN NEEDLE, DIABETIC 30 GX3/16"
NEEDLE, DISPOSABLE MISCELLANEOUS
Qty: 200 EACH | Refills: 3 | Status: SHIPPED | OUTPATIENT
Start: 2024-02-07 | End: 2024-04-08 | Stop reason: ALTCHOICE

## 2024-02-07 RX ORDER — LANCETS
EACH MISCELLANEOUS
Qty: 200 EACH | Refills: 3 | Status: SHIPPED | OUTPATIENT
Start: 2024-02-07 | End: 2024-04-08 | Stop reason: ALTCHOICE

## 2024-02-07 RX ADMIN — INSULIN GLARGINE 8 UNITS: 100 INJECTION, SOLUTION SUBCUTANEOUS at 07:50

## 2024-02-07 RX ADMIN — INSULIN LISPRO 4 UNITS: 100 INJECTION, SOLUTION INTRAVENOUS; SUBCUTANEOUS at 11:34

## 2024-02-07 RX ADMIN — PRENATAL VIT W/ FE FUMARATE-FA TAB 27-0.8 MG 1 TABLET: 27-0.8 TAB at 09:19

## 2024-02-07 ASSESSMENT — PAIN - FUNCTIONAL ASSESSMENT: PAIN_FUNCTIONAL_ASSESSMENT: 0-10

## 2024-02-07 ASSESSMENT — PAIN SCALES - GENERAL: PAINLEVEL_OUTOF10: 0 - NO PAIN

## 2024-02-07 NOTE — CARE PLAN
The patient's goals for the shift include go home when stable    The clinical goals for the shift include no signs of vaginal bleeding    Over the shift, the patient has no signs or symptoms of labor progression. She denies contractions or vaginal bleeding. Vital signs are stable. NST was reactive. Patient denies needs at this time.

## 2024-02-07 NOTE — CARE PLAN
Pt was stable this shift her VS were stable. Her BS was WNL. She had no cramping or contractions or VB. She is to be discharged

## 2024-02-07 NOTE — DISCHARGE SUMMARY
"Discharge Summary    Admission Date: 2024  Discharge Date: 2024    Discharge Diagnosis  Placental abruption, antepartum, unspecified trimester    Hospital Course   38 y.o.  at 27.0 wks  by LMP c/w 7 week US admitted for observation of vaginal bleeding on  with concern for placental abruption.  SSUS showed evidence of placental abruption, and patient had received BMZ  - . She had previously had an elevated 1-hour GTT, but had not yet had a 3-hour GTT and in the setting of BMZ administration was regularly requiring insulin on SSI so she was started on a regimen of Lantus, which was titrated to lantus 8/8 and lispro 4 with meals during her hospitalization. She did have an additional episode of vaginal bleeding , but by  her bleeding had resolved, and she had reassuring  testing with BPP 8/10 for fetal breathing movement. She was stable for discharge home with close outpatient follow up     Discharge Meds     Your medication list        START taking these medications        Instructions Last Dose Given Next Dose Due   Accu-Chek Guide test strips strip  Generic drug: blood sugar diagnostic      Use 1 strip to test sugar fasting and 1 hour after each meal. 4-6 times/day during pregnancy       alcohol swabs pads, medicated      Use 1, up to 5 times a day       blood-glucose meter misc  Commonly known as: Accu-Chek Guide Glucose Meter      Use for testing blood sugar in pregnancy       insulin lispro 100 unit/mL injection  Commonly known as: HumaLOG KwikPen Insulin      Inject 4 units before each meal 3 times daily.       lancets misc      Use 1 lancet 4-6 times per day during pregnancy       Lantus Solostar U-100 Insulin 100 unit/mL (3 mL) pen  Generic drug: insulin glargine      Inject 8 units in the morning before breakfast and at bedtime.       pen needle, diabetic 32 gauge x 5/32\" needle  Commonly known as: Pen Needle      Use 1 per injection, up to 5 times a day          " "    CONTINUE taking these medications        Instructions Last Dose Given Next Dose Due   aspirin 81 mg EC tablet      Take 2 tablets (162 mg) by mouth once daily.       docusate sodium 100 mg capsule  Commonly known as: Colace      Take 1 capsule (100 mg) by mouth 2 times a day as needed for constipation.       metoclopramide 10 mg tablet  Commonly known as: Reglan      Take 1 tablet (10 mg) by mouth every 6 hours.       PRENATAL MULTIVITAMINS ORAL                     Where to Get Your Medications        These medications were sent to [id number] - [place], [place] - [address] [address] [facility] AT [address] [facility] [address]      Phone: [phone]   Accu-Chek Guide test strips strip  alcohol swabs pads, medicated  blood-glucose meter misc  insulin lispro 100 unit/mL injection  lancets misc  Lantus Solostar U-100 Insulin 100 unit/mL (3 mL) pen  pen needle, diabetic 32 gauge x 5/32\" needle          Complications Requiring Follow-Up  GDM on insulin   Placenta previa     Test Results Pending At Discharge  Pending Labs       Order Current Status    Gluocse Tolerance Test, 3 hour (Pregnancy) In process    POCT urinalysis dipstick manually resulted In process            Outpatient Follow-Up  Future Appointments   Date Time Provider Department Center   2/19/2024 11:45 AM Jannie Masters MD [id number]   2/26/2024 11:15 AM [facility] ULTRASOUND 4 [id number] CHARLY Brody P       Pt dw Dr Richard Pearl MD  "

## 2024-02-07 NOTE — CARE PLAN
The patient's goals for the shift include go home when stable    The clinical goals for the shift include no signs of vaginal bleeding

## 2024-02-07 NOTE — PROGRESS NOTES
"ANTEPARTUM PROGRESS NOTE   2024, 9:28 AM     SUBJECTIVE: No acute events overnight. Continues to have no vaginal bleeding or spotting since 2/4 PM. Has occasional contractions.  Denies LOF. Reports normal fetal movement.     OBJECTIVE:   /63   Pulse 96   Temp 36.9 °C (98.4 °F) (Temporal)   Resp 18   Ht 1.727 m (5' 7.99\")   Wt 106 kg (232 lb 12.9 oz)   LMP  (LMP Unknown)   SpO2 96%   BMI 35.41 kg/m²    Temp  Min: 36.1 °C (97 °F)  Max: 37.4 °C (99.3 °F)  Pulse  Min: 77  Max: 112  BP  Min: 101/63  Max: 126/80    General:  AAOx3, No acute distress  Cardiovascular: Warm and well perfused  Respiratory: Normal respiratory effort   Abdominal:  Soft, gravid, nontender to palpation. no rebound or guarding  Extremities: Warm, well perfused     NST: Baseline 150/ mod variability + accels/ 1 spontaneous decel with 1hour of reassuring tracing following  [place]: quiet    Labs:   Lab Results   Component Value Date    WBC 10.3 2024    HGB 10.2 (L) 2024    HCT 30.7 (L) 2024     2024     Lab Results   Component Value Date    APTT 23 (L) 2024    PROTIME 10.6 2024    INR 0.9 2024     Lab Results   Component Value Date    FIBRINOGEN 351 2024       ASSESSMENT AND PLAN:     38 y.o.  at 28w0d by LMP c/w 7 wk U/S with vaginal bleeding.    Placental abruption confirmed by US  - On admission, SSE with ~5cc of dark red blood in vagina and from cervix. Cervix visually closed.   - Rh positive  - Last Hgb 10.9, coags wnl and fibrinogen 312 (2/3) -> Hgb 10.5, coags wnl and fibrinogen 299  - SSUS  showing 1.7 x 0.5 cm subchorionic hemorrhage      Hx of PEC  - Normotensive this admission  - Baseline HELLP labs wnl, baseline P:C 0.08  - On bASA     Elevated Glucose Levels  - 1hr screen - 145  - Will need 3-hour confirmation test - to be done outpatient  - Insulin regimen changed to (2/4): Lantus , lispro   - POCT glucose appropriate, will continue to " monitor    Fetal status: : NST AGA with 1 spontaneous decel with 1hour of reassuring tracing following  - Daily NSTs while inpatient   - Ultrasound ():  (53%), cephalic  - BMZ -     Routine:  - GBS: pos   - Flu & TDAP: Pt reports done with primary  - 1hr: 145 done on admission, pending 3 hour confirmation outpatient  - BCM: wants ppMirena      Dispo: Continue admission until clinical stability    Pt seen and discussed with M Attending, Dr. Richard Mason MD PGY-2  [facility]  Pager [phone]     Principal Problem:    Placental abruption, antepartum, unspecified trimester  Active Problems:    History of  delivery, currently pregnant    26 weeks gestation of pregnancy    Insulin controlled gestational diabetes mellitus (GDM) in second trimester

## 2024-02-13 ENCOUNTER — ROUTINE PRENATAL (OUTPATIENT)
Dept: OBSTETRICS AND GYNECOLOGY | Facility: CLINIC | Age: 39
End: 2024-02-13
Payer: MEDICAID

## 2024-02-13 VITALS — WEIGHT: 231 LBS | BODY MASS INDEX: 35.13 KG/M2 | DIASTOLIC BLOOD PRESSURE: 76 MMHG | SYSTOLIC BLOOD PRESSURE: 116 MMHG

## 2024-02-13 DIAGNOSIS — K59.00 CONSTIPATION DURING PREGNANCY IN FIRST TRIMESTER (HHS-HCC): ICD-10-CM

## 2024-02-13 DIAGNOSIS — O09.899 HISTORY OF PRETERM DELIVERY, CURRENTLY PREGNANT (HHS-HCC): ICD-10-CM

## 2024-02-13 DIAGNOSIS — O26.899 PREGNANCY HEADACHE, ANTEPARTUM (HHS-HCC): ICD-10-CM

## 2024-02-13 DIAGNOSIS — Z87.59 HISTORY OF PRE-ECLAMPSIA: ICD-10-CM

## 2024-02-13 DIAGNOSIS — O09.522 SUPERVISION OF HIGH RISK ELDERLY MULTIGRAVIDA IN SECOND TRIMESTER (HHS-HCC): ICD-10-CM

## 2024-02-13 DIAGNOSIS — Z3A.26 26 WEEKS GESTATION OF PREGNANCY (HHS-HCC): ICD-10-CM

## 2024-02-13 DIAGNOSIS — O09.529 ANTEPARTUM MULTIGRAVIDA OF ADVANCED MATERNAL AGE (HHS-HCC): ICD-10-CM

## 2024-02-13 DIAGNOSIS — O99.611 CONSTIPATION DURING PREGNANCY IN FIRST TRIMESTER (HHS-HCC): ICD-10-CM

## 2024-02-13 DIAGNOSIS — O45.90: ICD-10-CM

## 2024-02-13 DIAGNOSIS — R51.9 PREGNANCY HEADACHE, ANTEPARTUM (HHS-HCC): ICD-10-CM

## 2024-02-13 DIAGNOSIS — O24.414 INSULIN CONTROLLED GESTATIONAL DIABETES MELLITUS (GDM) IN SECOND TRIMESTER (HHS-HCC): ICD-10-CM

## 2024-02-13 LAB — SCAN RESULT: NORMAL

## 2024-02-13 PROCEDURE — 99213 OFFICE O/P EST LOW 20 MIN: CPT | Mod: GC,TH

## 2024-02-13 PROCEDURE — 99213 OFFICE O/P EST LOW 20 MIN: CPT

## 2024-02-13 NOTE — PROGRESS NOTES
.Ob Follow-up  24     SUBJECTIVE      HPI: Fiorella Leon is a 38 y.o.  at 28w6d here for RPNV.    Patient was previously admitted on - for vaginal bleeding. US during admission notable for small subchorionic hematoma. S/p x2 BMZ (-) with newly diagnosed GDM in s/o requiring uptitratin insulin through BMZ window during hospital admission. Currently on Lantus  and Lispro 4u with meals. BG have been appropriate at home. Denies low's BG, although intermittently feels sweats since starting insulin. She denies further episodes of vaginal bleeding or spotting since . Denies LOF. Feels good fetal movement.        OBJECTIVE  Visit Vitals  /76 Comment:    Wt 105 kg (231 lb)   LMP  (LMP Unknown)   BMI 35.13 kg/m²   OB Status Pregnant   Smoking Status Never   BSA 2.24 m²            ASSESSMENT & PLAN    Fiorella Leon is a 38 y.o.  at 28w6d here for the following concerns we addressed today:    Problem List Items Addressed This Visit       26 weeks gestation of pregnancy    Overview     Dating: by 13.5 wk US   [x] Initial BMI: 34  [x] Prenatal Labs: reviewed  [x] Aneuploidy Screening: neg cfDNA, s/p genetics for AMA  [x] Baby ASA:  [x] Anatomy US: reviewed  [x] 1hr GCT, CBC, and sylphis at 24-28wks: elevated 1hr.  [x] Tdap (27-36wks):   [x] Flu vaccine: 23  [] COVID vaccine:   [] RSV vaccine:  [] Rhogam (if Rh neg):   [] GBS at 36 wks:  [] Breastfeeding  [] PPBC:   [] 39 weeks discussion of IOL vs. Expectant management:  [] Mode of delivery:             Current Assessment & Plan     -Referral placed to [facility] for continued prenatal care          Antepartum multigravida of advanced maternal age    Overview     -s/p genetics  -cfDNA RR           Constipation during pregnancy in first trimester    History of pre-eclampsia    Overview     - PEC in first pregnancy  - baseline HELLP labs WNL, baseline P:C = 0.08  - Continue bASA 162mg every day          History of   delivery, currently pregnant    Overview       30 and 36w PTB  Counseled at initial MFM visit re: recurrence risk and symptom precautions   S/p cervical lengths from 16wks to 22 wks. wnl            Insulin controlled gestational diabetes mellitus (GDM) in second trimester    Overview     Lantus 8/8, Lispro 4/4 TID started during inpatient admission for vaginal bleeding         Current Assessment & Plan     -no changes to insulin regimen today  - Referral to outpatient MFM placed for GDM management          Relevant Orders    Glucose    Referral to Maternal Fetal Medicine    Placental abruption, antepartum, unspecified trimester    Overview     -vaginal bleeding resolved as of 2/4          RESOLVED: Pregnancy headache, antepartum    Supervision of high risk elderly multigravida in second trimester    Overview     Serial cervical lengths planned   RR cfDNA  Boy, yes to circ              Orders Placed This Encounter   Procedures    Glucose     Standing Status:   Future     Standing Expiration Date:   2/13/2025     Order Specific Question:   Release result to [place]     Answer:   Immediate [1]    Referral to Maternal Fetal Medicine     Standing Status:   Future     Standing Expiration Date:   8/13/2024     Referral Priority:   Routine     Referral Type:   Consultation     Referral Reason:   Specialty Services Required     Requested Specialty:   Maternal and Fetal Medicine     Number of Visits Requested:   1        RTC in 2 weeks with MFM providers    Seen and d/w Dr. Rick,    Citlali Richardson MD , PGY-2

## 2024-02-16 ENCOUNTER — APPOINTMENT (OUTPATIENT)
Dept: OBSTETRICS AND GYNECOLOGY | Facility: CLINIC | Age: 39
End: 2024-02-16
Payer: MEDICAID

## 2024-02-19 ENCOUNTER — APPOINTMENT (OUTPATIENT)
Dept: OBSTETRICS AND GYNECOLOGY | Facility: CLINIC | Age: 39
End: 2024-02-19
Payer: MEDICAID

## 2024-02-20 ENCOUNTER — APPOINTMENT (OUTPATIENT)
Dept: MATERNAL FETAL MEDICINE | Facility: CLINIC | Age: 39
End: 2024-02-20
Payer: MEDICAID

## 2024-02-26 ENCOUNTER — HOSPITAL ENCOUNTER (OUTPATIENT)
Dept: RADIOLOGY | Facility: CLINIC | Age: 39
Discharge: HOME | End: 2024-02-26
Payer: COMMERCIAL

## 2024-02-26 DIAGNOSIS — Z32.01 PREGNANCY TEST POSITIVE (HHS-HCC): ICD-10-CM

## 2024-02-26 PROBLEM — Z3A.26 26 WEEKS GESTATION OF PREGNANCY (HHS-HCC): Status: RESOLVED | Noted: 2024-01-26 | Resolved: 2024-02-26

## 2024-02-26 PROBLEM — Z3A.30 30 WEEKS GESTATION OF PREGNANCY (HHS-HCC): Status: ACTIVE | Noted: 2024-01-26

## 2024-02-26 PROCEDURE — 76816 OB US FOLLOW-UP PER FETUS: CPT

## 2024-02-26 PROCEDURE — 76816 OB US FOLLOW-UP PER FETUS: CPT | Performed by: STUDENT IN AN ORGANIZED HEALTH CARE EDUCATION/TRAINING PROGRAM

## 2024-02-26 PROCEDURE — 76819 FETAL BIOPHYS PROFIL W/O NST: CPT | Performed by: STUDENT IN AN ORGANIZED HEALTH CARE EDUCATION/TRAINING PROGRAM

## 2024-02-26 NOTE — PROGRESS NOTES
MFM Follow-up  24        SUBJECTIVE    HPI: Fiorella Leon is a 39 y.o.  at 30w6d here for RPNV. Denies contractions  or LOF. Reports some light pink spotting but no bright red vaginal bleeding. normal fetal movement. Patient reports issues with insurance, was told she does not qualify for medicaid due to income but stopped working in November- currently does not have insurance coverage       OBJECTIVE  Visit Vitals  /68   Pulse 87   Wt 106 kg (233 lb)   LMP  (LMP Unknown)   BMI 35.44 kg/m²   OB Status Pregnant   Smoking Status Never   BSA 2.26 m²      FHT: 150    ASSESSMENT & PLAN    Fiorella Leon is a 39 y.o.  at 30w6d here for the following concerns we addressed today:    Insulin controlled gestational diabetes mellitus (GDM) in second trimester  POCT  today: 82  Blood sugars reviewed:  Patient did not have logs, report fastings in 80s, postprandials 110s-120s   Currently managed on: Lantus 8/8, Lispro 4/4 TID   Regimen unchanged this visit    Weekly  testing in setting of history of placental abruption and A2 DM, plan for twice weekly  testing to start at 32w     History of pre-eclampsia  BP today 109/68  - Asx    History of  delivery, currently pregnant  Hx 30 and 36w PTB  S/p cervical lengths from 16wks to 22 wks. Wnl  - Asx today     30 weeks gestation of pregnancy  - 3rd tri labs today  - Covid vaccine discussed, plans to get next visit   - concerns with insurance- SW today to help get medicaid coverage     Placental abruption, antepartum, unspecified trimester  Some pink when wiping sometimes for the last couple of weeks, no bright red bleeding,   Weekly  testing     Orders Placed This Encounter   Procedures    US MAC OB imaging order     Standing Status:   Future     Standing Expiration Date:   2025     Order Specific Question:   Reason for exam:     Answer:   weekly BPP     Order Specific Question:   Radiologist to Determine Optimal Study      Answer:   Yes     Order Specific Question:   Release result to MyChart     Answer:   Immediate [1]     Order Specific Question:   Is this exam part of a Research Study? If Yes, link this order to the research study     Answer:   No    CBC Anemia Panel With Reflex, Pregnancy     Standing Status:   Future     Standing Expiration Date:   2/26/2025     Order Specific Question:   Release result to MyChart     Answer:   Immediate [1]    Syphilis Screen with Reflex     Standing Status:   Future     Standing Expiration Date:   2/26/2025     Order Specific Question:   Release result to MyChart     Answer:   Immediate [1]    POCT GLUCOSE     Order Specific Question:   Release result to MyChart     Answer:   Immediate        RTC in 2 weeks    Patient seen and evaluated with Dr. Richard Pearl MD

## 2024-02-26 NOTE — ASSESSMENT & PLAN NOTE
- 3rd tri labs today  - Covid vaccine discussed, plans to get next visit   - concerns with insurance- SW today to help get medicaid coverage

## 2024-02-26 NOTE — ASSESSMENT & PLAN NOTE
POCT  today: 82  Blood sugars reviewed:  Patient did not have logs, report fastings in 80s, postprandials 110s-120s   Currently managed on: Lantus , Lispro 4/4 TID   Regimen unchanged this visit    Weekly  testing in setting of history of placental abruption and A2 DM, plan for twice weekly  testing to start at 32w

## 2024-02-27 ENCOUNTER — ROUTINE PRENATAL (OUTPATIENT)
Dept: MATERNAL FETAL MEDICINE | Facility: CLINIC | Age: 39
End: 2024-02-27
Payer: MEDICAID

## 2024-02-27 VITALS
WEIGHT: 233 LBS | BODY MASS INDEX: 35.44 KG/M2 | SYSTOLIC BLOOD PRESSURE: 109 MMHG | HEART RATE: 87 BPM | DIASTOLIC BLOOD PRESSURE: 68 MMHG

## 2024-02-27 DIAGNOSIS — O09.899 HISTORY OF PRETERM DELIVERY, CURRENTLY PREGNANT (HHS-HCC): ICD-10-CM

## 2024-02-27 DIAGNOSIS — Z3A.30 30 WEEKS GESTATION OF PREGNANCY (HHS-HCC): Primary | ICD-10-CM

## 2024-02-27 DIAGNOSIS — O24.414 INSULIN CONTROLLED GESTATIONAL DIABETES MELLITUS (GDM) IN SECOND TRIMESTER (HHS-HCC): ICD-10-CM

## 2024-02-27 DIAGNOSIS — O45.90: ICD-10-CM

## 2024-02-27 DIAGNOSIS — Z87.59 HISTORY OF PRE-ECLAMPSIA: ICD-10-CM

## 2024-02-27 LAB — GLUCOSE BLD MANUAL STRIP-MCNC: 82 MG/DL (ref 74–99)

## 2024-02-27 PROCEDURE — 82947 ASSAY GLUCOSE BLOOD QUANT: CPT | Performed by: OBSTETRICS & GYNECOLOGY

## 2024-02-27 PROCEDURE — 99214 OFFICE O/P EST MOD 30 MIN: CPT | Mod: GC | Performed by: OBSTETRICS & GYNECOLOGY

## 2024-02-27 PROCEDURE — 99214 OFFICE O/P EST MOD 30 MIN: CPT | Performed by: OBSTETRICS & GYNECOLOGY

## 2024-02-27 ASSESSMENT — PAIN - FUNCTIONAL ASSESSMENT: PAIN_FUNCTIONAL_ASSESSMENT: 0-10

## 2024-02-27 ASSESSMENT — PAIN SCALES - GENERAL: PAINLEVEL_OUTOF10: 0 - NO PAIN

## 2024-02-27 NOTE — ASSESSMENT & PLAN NOTE
Some pink when wiping sometimes for the last couple of weeks, no bright red bleeding,   Weekly  testing

## 2024-03-08 ENCOUNTER — APPOINTMENT (OUTPATIENT)
Dept: OBSTETRICS AND GYNECOLOGY | Facility: CLINIC | Age: 39
End: 2024-03-08
Payer: MEDICAID

## 2024-03-11 ENCOUNTER — TELEPHONE (OUTPATIENT)
Dept: MATERNAL FETAL MEDICINE | Facility: CLINIC | Age: 39
End: 2024-03-11
Payer: MEDICAID

## 2024-03-11 NOTE — TELEPHONE ENCOUNTER
Called patient to assist with scheduling MFM follow up and  testing appointments. Left message for patient to return call.     SILVESTRE Garces RN

## 2024-03-12 ENCOUNTER — HOSPITAL ENCOUNTER (OUTPATIENT)
Dept: RADIOLOGY | Facility: CLINIC | Age: 39
Discharge: HOME | End: 2024-03-12
Payer: MEDICAID

## 2024-03-12 ENCOUNTER — ROUTINE PRENATAL (OUTPATIENT)
Dept: MATERNAL FETAL MEDICINE | Facility: CLINIC | Age: 39
End: 2024-03-12
Payer: MEDICAID

## 2024-03-12 ENCOUNTER — LAB (OUTPATIENT)
Dept: LAB | Facility: LAB | Age: 39
End: 2024-03-12
Payer: MEDICAID

## 2024-03-12 VITALS
WEIGHT: 238 LBS | DIASTOLIC BLOOD PRESSURE: 78 MMHG | HEART RATE: 97 BPM | SYSTOLIC BLOOD PRESSURE: 123 MMHG | BODY MASS INDEX: 36.2 KG/M2

## 2024-03-12 DIAGNOSIS — Z3A.32 32 WEEKS GESTATION OF PREGNANCY (HHS-HCC): Primary | ICD-10-CM

## 2024-03-12 DIAGNOSIS — O24.414 INSULIN CONTROLLED GESTATIONAL DIABETES MELLITUS (GDM) IN THIRD TRIMESTER (HHS-HCC): ICD-10-CM

## 2024-03-12 DIAGNOSIS — Z3A.30 30 WEEKS GESTATION OF PREGNANCY (HHS-HCC): ICD-10-CM

## 2024-03-12 DIAGNOSIS — O45.90: ICD-10-CM

## 2024-03-12 DIAGNOSIS — Z87.59 HISTORY OF PRE-ECLAMPSIA: ICD-10-CM

## 2024-03-12 DIAGNOSIS — O09.529 ANTEPARTUM MULTIGRAVIDA OF ADVANCED MATERNAL AGE (HHS-HCC): ICD-10-CM

## 2024-03-12 DIAGNOSIS — O24.414 INSULIN CONTROLLED GESTATIONAL DIABETES MELLITUS (GDM) IN SECOND TRIMESTER (HHS-HCC): ICD-10-CM

## 2024-03-12 DIAGNOSIS — O09.899 HISTORY OF PRETERM DELIVERY, CURRENTLY PREGNANT (HHS-HCC): ICD-10-CM

## 2024-03-12 LAB
ERYTHROCYTE [DISTWIDTH] IN BLOOD BY AUTOMATED COUNT: 13.1 % (ref 11.5–14.5)
EST. AVERAGE GLUCOSE BLD GHB EST-MCNC: 100 MG/DL
GLUCOSE BLD MANUAL STRIP-MCNC: 160 MG/DL (ref 74–99)
GLUCOSE SERPL-MCNC: 121 MG/DL (ref 74–99)
HBA1C MFR BLD: 5.1 %
HCT VFR BLD AUTO: 33.1 % (ref 36–46)
HGB BLD-MCNC: 11.3 G/DL (ref 12–16)
MCH RBC QN AUTO: 32.7 PG (ref 26–34)
MCHC RBC AUTO-ENTMCNC: 34.1 G/DL (ref 32–36)
MCV RBC AUTO: 96 FL (ref 80–100)
NRBC BLD-RTO: 0 /100 WBCS (ref 0–0)
PLATELET # BLD AUTO: 271 X10*3/UL (ref 150–450)
RBC # BLD AUTO: 3.46 X10*6/UL (ref 4–5.2)
REFLEX ADDED, ANEMIA PANEL: NORMAL
TREPONEMA PALLIDUM IGG+IGM AB [PRESENCE] IN SERUM OR PLASMA BY IMMUNOASSAY: NONREACTIVE
WBC # BLD AUTO: 6.5 X10*3/UL (ref 4.4–11.3)

## 2024-03-12 PROCEDURE — 85027 COMPLETE CBC AUTOMATED: CPT

## 2024-03-12 PROCEDURE — 76819 FETAL BIOPHYS PROFIL W/O NST: CPT

## 2024-03-12 PROCEDURE — 76819 FETAL BIOPHYS PROFIL W/O NST: CPT | Performed by: OBSTETRICS & GYNECOLOGY

## 2024-03-12 PROCEDURE — 99214 OFFICE O/P EST MOD 30 MIN: CPT | Performed by: OBSTETRICS & GYNECOLOGY

## 2024-03-12 PROCEDURE — 36415 COLL VENOUS BLD VENIPUNCTURE: CPT

## 2024-03-12 PROCEDURE — 83036 HEMOGLOBIN GLYCOSYLATED A1C: CPT

## 2024-03-12 PROCEDURE — 99214 OFFICE O/P EST MOD 30 MIN: CPT | Mod: GC | Performed by: OBSTETRICS & GYNECOLOGY

## 2024-03-12 PROCEDURE — 82947 ASSAY GLUCOSE BLOOD QUANT: CPT

## 2024-03-12 PROCEDURE — 87389 HIV-1 AG W/HIV-1&-2 AB AG IA: CPT

## 2024-03-12 PROCEDURE — 86780 TREPONEMA PALLIDUM: CPT

## 2024-03-12 PROCEDURE — 82947 ASSAY GLUCOSE BLOOD QUANT: CPT | Performed by: OBSTETRICS & GYNECOLOGY

## 2024-03-12 ASSESSMENT — PAIN - FUNCTIONAL ASSESSMENT: PAIN_FUNCTIONAL_ASSESSMENT: 0-10

## 2024-03-12 ASSESSMENT — PAIN SCALES - GENERAL: PAINLEVEL_OUTOF10: 0 - NO PAIN

## 2024-03-12 NOTE — PROGRESS NOTES
Subjective   Patient ID [id number]   Fiorella Leon is a 39 y.o.  at 32w6d with a working estimated date of delivery of 2024, by Ultrasound who presents for a routine prenatal visit. She denies vaginal bleeding, leakage of fluid, decreased fetal movements, or contractions.    GDM: Did not bring BG log, but post breakfast elevated 135-140s. Otherwise reports fastings typically 80s and post lunch and dinner     in clinic today ~ 1 hour after eating, but had donuts and sweet tea, bfast with son at school. Enjoys cereals and juices for breakfast.     Objective   Physical Exam:   Weight: 108 kg (238 lb)  Expected Total Weight Gain: 5 kg (11 lb)-9 kg (19 lb)   Pregravid BMI: 30.87  BP: 123/78  Fetal Heart Rate: u/s               Prenatal Labs  Urine Dip:  Lab Results   Component Value Date    KETONESU Negative 2024    GLUCOSEUR NEGATIVE 2024    LEUKOCYTESUR NEGATIVE 2024     Lab Results   Component Value Date    HGB 10.2 (L) 2024    HCT 30.7 (L) 2024    ABO A 2024    HEPBSAG Nonreactive 10/30/2023         Assessment/Plan     Problem List Items Addressed This Visit       History of pre-eclampsia    Overview     - PEC in first pregnancy  - baseline HELLP labs WNL, baseline P:C = 0.08  - Continue bASA 162mg every day          Antepartum multigravida of advanced maternal age    Overview     -s/p genetics  -cfDNA RR           History of  delivery, currently pregnant    Overview     30 and 36w PTB  Counseled at initial [facility] visit re: recurrence risk and symptom precautions   S/p cervical lengths from 16wks to 22 wks. wnl            Placental abruption, antepartum, unspecified trimester    Overview     Inpatient admission -  SSUS showed evidence of placental abruption  - Received BMZ  -    - Vaginal bleeding resolved as of           Relevant Orders    Follow Up In Maternal Fetal Medicine    Insulin controlled gestational diabetes mellitus (GDM) in third  trimester    Overview     Diagnosed at 16w with A1c of 7.0  [X] ASA  [X] MFM consult - inpatient   [ ] Serial growth  [x] Nutrition  [x] [employer]    Last ultrasound:    Fetal surveillance:  [ ] Twice weekly at 32 weeks    Current Regimen:    Delivery Plan:  Recommended gestational age: pending 36 week follow up visit  Intrapartum:  GDM protocol  Postpartum: No medication    Lantus 8/8, Lispro 4/4 TID started during inpatient admission for vaginal bleeding  3/12: Will change to Lantus 8/8 and Lispro 6/4/4. Lispro 6 only when having higher carb breakfast.         Relevant Orders    Hemoglobin A1C    Follow Up In Maternal Fetal Medicine    32 weeks gestation of pregnancy - Primary    Overview     Dating: by 13.5 wk US   [x] Initial BMI: 34  [x] Prenatal Labs: reviewed  [x] Aneuploidy Screening: neg cfDNA, s/p genetics for AMA  [x] Baby ASA: rx   [x] Anatomy US: reviewed  [x] 1hr GCT, CBC, and sylphis at 24-28wks: elevated 1hr.  [x] Tdap (27-36wks):   [x] Flu vaccine: 23  [x] COVID vaccine: Declines  [x] Rhogam (if Rh neg): A POS, not indicated  [] GBS at 36 wks:  [] Breastfeeding - needs pump! Now has insurance  [x] PPBC:  ppMirena   [x] Mode of delivery:  anticipate vaginal            -Continue prenatal vitamin and ASA.  -Will change to Lantus 8/8 and Lispro 6/4/4. Lispro 6 only when having higher carb breakfast.   -Twice weekly  testing  -Labs today: CBC, syphilis, A1c  -Follow up in 2 week for a routine prenatal visit.    Discussed and seen with Dr. Richard Henao MD

## 2024-03-14 LAB — HIV 1+2 AB+HIV1 P24 AG SERPL QL IA: NONREACTIVE

## 2024-03-19 ENCOUNTER — APPOINTMENT (OUTPATIENT)
Dept: OBSTETRICS AND GYNECOLOGY | Facility: CLINIC | Age: 39
End: 2024-03-19
Payer: MEDICAID

## 2024-03-19 ENCOUNTER — HOSPITAL ENCOUNTER (OUTPATIENT)
Dept: RADIOLOGY | Facility: CLINIC | Age: 39
Discharge: HOME | End: 2024-03-19
Payer: MEDICAID

## 2024-03-19 DIAGNOSIS — Z03.74 ENCOUNTER FOR SUSPECTED PROBLEM WITH FETAL GROWTH RULED OUT: ICD-10-CM

## 2024-03-19 PROCEDURE — 76819 FETAL BIOPHYS PROFIL W/O NST: CPT

## 2024-03-19 PROCEDURE — 76816 OB US FOLLOW-UP PER FETUS: CPT | Performed by: OBSTETRICS & GYNECOLOGY

## 2024-03-19 PROCEDURE — 76816 OB US FOLLOW-UP PER FETUS: CPT

## 2024-03-19 PROCEDURE — 76819 FETAL BIOPHYS PROFIL W/O NST: CPT | Performed by: OBSTETRICS & GYNECOLOGY

## 2024-03-20 ENCOUNTER — PATIENT MESSAGE (OUTPATIENT)
Dept: MATERNAL FETAL MEDICINE | Facility: CLINIC | Age: 39
End: 2024-03-20
Payer: MEDICAID

## 2024-03-21 ENCOUNTER — APPOINTMENT (OUTPATIENT)
Dept: RADIOLOGY | Facility: CLINIC | Age: 39
End: 2024-03-21
Payer: MEDICAID

## 2024-03-22 ENCOUNTER — PROCEDURE VISIT (OUTPATIENT)
Dept: OBSTETRICS AND GYNECOLOGY | Facility: CLINIC | Age: 39
End: 2024-03-22
Payer: MEDICAID

## 2024-03-22 ENCOUNTER — APPOINTMENT (OUTPATIENT)
Dept: OBSTETRICS AND GYNECOLOGY | Facility: CLINIC | Age: 39
End: 2024-03-22
Payer: MEDICAID

## 2024-03-22 ENCOUNTER — HOSPITAL ENCOUNTER (OUTPATIENT)
Facility: HOSPITAL | Age: 39
Discharge: HOME | End: 2024-03-22
Attending: OBSTETRICS & GYNECOLOGY | Admitting: OBSTETRICS & GYNECOLOGY
Payer: MEDICAID

## 2024-03-22 VITALS
HEIGHT: 67 IN | OXYGEN SATURATION: 85 % | TEMPERATURE: 97.3 F | WEIGHT: 240.96 LBS | RESPIRATION RATE: 18 BRPM | SYSTOLIC BLOOD PRESSURE: 136 MMHG | HEART RATE: 90 BPM | DIASTOLIC BLOOD PRESSURE: 72 MMHG | BODY MASS INDEX: 37.82 KG/M2

## 2024-03-22 VITALS
DIASTOLIC BLOOD PRESSURE: 79 MMHG | BODY MASS INDEX: 36.65 KG/M2 | WEIGHT: 241 LBS | HEART RATE: 85 BPM | SYSTOLIC BLOOD PRESSURE: 115 MMHG

## 2024-03-22 DIAGNOSIS — Z3A.34 34 WEEKS GESTATION OF PREGNANCY (HHS-HCC): ICD-10-CM

## 2024-03-22 DIAGNOSIS — O45.90: Primary | ICD-10-CM

## 2024-03-22 DIAGNOSIS — O24.414 INSULIN CONTROLLED GESTATIONAL DIABETES MELLITUS (GDM) IN THIRD TRIMESTER (HHS-HCC): ICD-10-CM

## 2024-03-22 LAB
POC APPEARANCE, URINE: CLEAR
POC BILIRUBIN, URINE: NEGATIVE
POC BLOOD, URINE: ABNORMAL
POC COLOR, URINE: YELLOW
POC GLUCOSE, URINE: NEGATIVE MG/DL
POC KETONES, URINE: NEGATIVE MG/DL
POC LEUKOCYTES, URINE: ABNORMAL
POC NITRITE,URINE: NEGATIVE
POC PH, URINE: 7 PH
POC PROTEIN, URINE: ABNORMAL MG/DL
POC SPECIFIC GRAVITY, URINE: 1.02
POC UROBILINOGEN, URINE: 0.2 EU/DL

## 2024-03-22 PROCEDURE — 99213 OFFICE O/P EST LOW 20 MIN: CPT

## 2024-03-22 PROCEDURE — 59025 FETAL NON-STRESS TEST: CPT

## 2024-03-22 PROCEDURE — 59025 FETAL NON-STRESS TEST: CPT | Performed by: OBSTETRICS & GYNECOLOGY

## 2024-03-22 ASSESSMENT — PAIN SCALES - GENERAL
PAINLEVEL_OUTOF10: 0 - NO PAIN
PAINLEVEL_OUTOF10: 2

## 2024-03-22 NOTE — H&P
Obstetrical TRIAGE History and Physical     Fiorella Leon is a 39 y.o. . 34w2d by 13w US here from the office for NRNST.     Chief Complaint: Non-stress Test (Sent from office)    Assessment/Plan    Prolonged monitoring, IUP at 34w2d  - Late, prolonged decel in the office this AM  - provided snacks and juice  - prolonged monitoring, Cat I  - BPP  per Dr. Trejo  - Good fetal movement  - Continue routine prenatal care  - Next appt  with Dr. Ramos  - return precautions reviewed     R/o PTL  - hx of 2  vaginal deliveries   - pt comfortable  - Cervix: 3  - TOCO: q6-10min, irregular; patient appreciating < 4 an hour  - S/sx of labor reviewed  - Recommended tylenol, warm showers, hot packs for discomfort     Maternal Well-being  - Vital signs stable and WNL  - All questions and concerns addressed    Dispo:  - appropriate for discharge to home, patient comfortable with this   - return precautions reviewed     Discussed with Dr. Trejo, tracing reviewed, agrees with plan to d/c home.   Coral Mukherjee PA-C      Active Problems:  There are no active Hospital Problems.      Pregnancy Problems (from 10/09/23 to present)       Problem Noted Resolved    32 weeks gestation of pregnancy 3/12/2024 by Lisa Henao MD No    Priority:  Medium      Overview Signed 3/12/2024 11:45 AM by Lisa Henao MD     Dating: by 13.5 wk US   [x] Initial BMI: 34  [x] Prenatal Labs: reviewed  [x] Aneuploidy Screening: neg cfDNA, s/p genetics for AMA  [x] Baby ASA: rx   [x] Anatomy US: reviewed  [x] 1hr GCT, CBC, and sylphis at 24-28wks: elevated 1hr.  [x] Tdap (27-36wks):   [x] Flu vaccine: 23  [x] COVID vaccine: Declines  [x] Rhogam (if Rh neg): A POS, not indicated  [] GBS at 36 wks:  [] Breastfeeding - needs pump! Now has insurance  [x] PPBC:  ppMirena   [x] Mode of delivery:  anticipate vaginal          Insulin controlled gestational diabetes mellitus (GDM) in third trimester 2024 by Lan Alejandro MD  No    Priority:  Medium      Overview Addendum 3/12/2024 11:39 AM by Lisa Henao MD     Diagnosed at 16w with A1c of 7.0  [X] ASA  [X] MFM consult - inpatient   [ ] Serial growth  [x] Nutrition  [x] [employer]    Last ultrasound:    Fetal surveillance:  [ ] Twice weekly at 32 weeks    Current Regimen:    Delivery Plan:  Recommended gestational age: pending 36 week follow up visit  Intrapartum:  GDM protocol  Postpartum: No medication    Lantus 8/8, Lispro 4/4 TID started during inpatient admission for vaginal bleeding  3/12: Will change to Lantus 8/8 and Lispro 6/4/4. Lispro 6 only when having higher carb breakfast.         Placental abruption, antepartum, unspecified trimester 2024 by Nima Gonsales MD No    Priority:  Medium      Overview Addendum 2024  5:05 PM by Erika Mejía MD     Inpatient admission -  SSUS showed evidence of placental abruption  - Received BMZ  -    - Vaginal bleeding resolved as of           Supervision of high risk elderly multigravida in second trimester 2023 by JOSE F Curtis No    Priority:  Medium      Overview Addendum 3/12/2024 11:36 AM by Lisa Henao MD     Declines COVID, s/p Flu and Tdap  PPBC: Mirena interval         History of pre-eclampsia 10/10/2023 by RAYNE Gaston, APRN-CNP No    Priority:  Medium      Overview Addendum 2023  9:09 PM by RAYNE Magana     - PEC in first pregnancy  - baseline HELLP labs WNL, baseline P:C = 0.08  - Continue bASA 162mg every day          Constipation during pregnancy in first trimester 10/10/2023 by RAYNE Gaston APRN-CNP No    Priority:  Medium      Antepartum multigravida of advanced maternal age 10/10/2023 by RAYNE Gaston, APRN-CNP No    Priority:  Medium      Overview Addendum 2023 10:20 PM by JOSE F Curtis     -s/p genetics  -cfDNA RR           History of  delivery, currently pregnant  10/10/2023 by Alexa Gutierrez V, SUN-JAKY, APRN-CNP No    Priority:  Medium      Overview Addendum 2024  1:30 PM by Erika Pearl MD     30 and 36w PTB  Counseled at initial [facility] visit re: recurrence risk and symptom precautions   S/p cervical lengths from 16wks to 22 wks. wnl            Obesity complicating pregnancy, childbirth, or puerperium, antepartum 10/10/2023 by Alexa Gutierrez V, SUN-JAKY, APRN-CNP No    Priority:  Medium      Overview Addendum 2024 12:54 PM by Jannie Masters MD     BMI=31 at NOB  16 wk visit, BMI 35         30 weeks gestation of pregnancy 2024 by Jannie Masters MD 3/12/2024 by Lisa Henao MD    Overview Addendum 3/12/2024 11:44 AM by Lisa Henao MD                Pregnancy headache, antepartum 10/10/2023 by Alexa Gutierrez V, SUN-JAKY, APRN-CNP 2024 by Citlali Richardson MD          Tracy Barros is here from the office with a prolonged late decel. Good fetal movement. Denies vaginal bleeding., Denies leaking of fluid.  Has hx of PTL x2, vaginal deliveries at 36wk for PEC and 30wks for PTL.    Patient states she gets weekly NSTs, she thinks for GDMA on insulin. She states she is here due to a decel seen on the tracing. She endorses <4 contractions an hour and pelvic pressure. She denies other concerns.    She states she took her lantus and lispro this AM with breakfast. Has not had anything to eat since. She states BG this AM was 88 and has generally been controlled with her current regimen. Denies n/v, fever, chills, dizziness.      Obstetrical History   OB History    Para Term  AB Living   5 2 0 2 2 2   SAB IAB Ectopic Multiple Live Births   2 0 0 0 2      # Outcome Date GA Lbr Jayce/2nd Weight Sex Delivery Anes PTL Lv   5 Current            4 SAB 2023 8w0d          3   30w0d   M Vag-Spont  Y ELIZABETH   2   36w0d  2.948 kg M Vag-Spont EPI N ELIZABETH      Complications: Preeclampsia   1 2002 16w0d   F           "Complications: Abruptio Placenta       Past Medical History  Past Medical History:   Diagnosis Date    History of placenta abruption 09/30/2023    Pregnancy headache, antepartum 10/10/2023        Past Surgical History   Past Surgical History:   Procedure Laterality Date    MOUTH SURGERY  08/30/2017    Oral Surgery Tooth Extraction       Social History  Social History     Tobacco Use    Smoking status: Never    Smokeless tobacco: Never   Substance Use Topics    Alcohol use: Never     Substance and Sexual Activity   Drug Use Never       Allergies  Patient has no known allergies.     Medications  Medications Prior to Admission   Medication Sig Dispense Refill Last Dose    alcohol swabs pads, medicated Use 1, up to 5 times a day 200 each 3     aspirin 81 mg EC tablet Take 2 tablets (162 mg) by mouth once daily. 60 tablet 11     blood sugar diagnostic (Accu-Chek Guide test strips) strip Use 1 strip to test sugar fasting and 1 hour after each meal. 4-6 times/day during pregnancy 150 each 3     blood-glucose meter (Accu-Chek Guide Glucose Meter) misc Use for testing blood sugar in pregnancy 1 each 0     docusate sodium (Colace) 100 mg capsule Take 1 capsule (100 mg) by mouth 2 times a day as needed for constipation. 30 capsule 4     insulin glargine (Lantus Solostar U-100 Insulin) 100 unit/mL (3 mL) pen Inject 8 units in the morning before breakfast and at bedtime. 5 each 3     insulin lispro (HumaLOG KwikPen Insulin) 100 unit/mL injection Inject 4 units before each meal 3 times daily. 5 each 3     lancets misc Use 1 lancet 4-6 times per day during pregnancy 200 each 3     metoclopramide (Reglan) 10 mg tablet Take 1 tablet (10 mg) by mouth every 6 hours. 30 tablet 1     pen needle, diabetic (Pen Needle) 32 gauge x 5/32\" needle Use 1 per injection, up to 5 times a day 200 each 3     PNV no.95/ferrous fum/folic ac (PRENATAL MULTIVITAMINS ORAL) Take by mouth.          Objective    Last Vitals  Temp Pulse Resp BP MAP O2 Sat "   36.8 °C (98.2 °F) 87 18 114/67   98 %     Physical Examination  GENERAL: Examination reveals a well developed, well nourished, gravid female in no acute distress. She is alert and cooperative.  ABDOMEN: soft, gravid, nontender, nondistended, no abnormal masses, no epigastric pain  FHR is 135, Cat I  TOCO: q6-10min, irregular; patient appreciating < 4 an hour   CERVIX: 1/30/-3  PSYCHOLOGICAL: awake and alert; oriented to person, place, and time    Lab Review  Labs in chart were reviewed.

## 2024-03-22 NOTE — PROCEDURES
Fiorella Leon, a  at 34w2d with an ANA CRISTINA of 2024, by Ultrasound, was seen at [facility] for a nonstress test.    Non-Stress Test   Baseline Fetal Heart Rate for Non-Stress Test: 145 BPM  Variability in Waveform for Non-Stress Test: Moderate  Accelerations in Non-Stress Test: No  Decelerations in Non-Stress Test: (!) Late, Prolonged (4 minute decel)  Contractions in Non-Stress Test: Irregular  Interpretation of Non-Stress Test   Interpretation of Non-Stress Test: (!) Non-reactive      Patient sent to L&D for prolonged monitoring.

## 2024-03-24 PROBLEM — Z3A.34 34 WEEKS GESTATION OF PREGNANCY (HHS-HCC): Status: ACTIVE | Noted: 2024-03-12

## 2024-03-25 ENCOUNTER — ANESTHESIA EVENT (OUTPATIENT)
Dept: OBSTETRICS AND GYNECOLOGY | Facility: HOSPITAL | Age: 39
End: 2024-03-25
Payer: MEDICAID

## 2024-03-25 ENCOUNTER — APPOINTMENT (OUTPATIENT)
Dept: RADIOLOGY | Facility: CLINIC | Age: 39
End: 2024-03-25
Payer: MEDICAID

## 2024-03-25 ENCOUNTER — ANESTHESIA (OUTPATIENT)
Dept: OBSTETRICS AND GYNECOLOGY | Facility: HOSPITAL | Age: 39
End: 2024-03-25
Payer: MEDICAID

## 2024-03-25 ENCOUNTER — HOSPITAL ENCOUNTER (EMERGENCY)
Facility: HOSPITAL | Age: 39
Discharge: STILL ADMITTED OR ADMINISTRATIVE | End: 2024-03-25
Payer: MEDICAID

## 2024-03-25 ENCOUNTER — APPOINTMENT (OUTPATIENT)
Dept: RADIOLOGY | Facility: HOSPITAL | Age: 39
End: 2024-03-25
Payer: MEDICAID

## 2024-03-25 ENCOUNTER — HOSPITAL ENCOUNTER (INPATIENT)
Facility: HOSPITAL | Age: 39
LOS: 4 days | Discharge: HOME | End: 2024-03-29
Attending: OBSTETRICS & GYNECOLOGY | Admitting: STUDENT IN AN ORGANIZED HEALTH CARE EDUCATION/TRAINING PROGRAM
Payer: MEDICAID

## 2024-03-25 VITALS
BODY MASS INDEX: 37.59 KG/M2 | SYSTOLIC BLOOD PRESSURE: 117 MMHG | WEIGHT: 240 LBS | OXYGEN SATURATION: 98 % | RESPIRATION RATE: 16 BRPM | HEART RATE: 93 BPM | DIASTOLIC BLOOD PRESSURE: 71 MMHG

## 2024-03-25 PROBLEM — O45.90: Status: RESOLVED | Noted: 2024-01-31 | Resolved: 2024-03-25

## 2024-03-25 PROBLEM — K21.9 GASTROESOPHAGEAL REFLUX DISEASE: Status: ACTIVE | Noted: 2024-03-25

## 2024-03-25 PROBLEM — O45.93 PLACENTAL ABRUPTION IN THIRD TRIMESTER (HHS-HCC): Status: ACTIVE | Noted: 2024-03-25

## 2024-03-25 PROBLEM — N93.9 VAGINAL BLEEDING: Status: ACTIVE | Noted: 2024-03-25

## 2024-03-25 PROBLEM — O09.522: Status: RESOLVED | Noted: 2023-11-13 | Resolved: 2024-03-25

## 2024-03-25 LAB
ABO GROUP (TYPE) IN BLOOD: NORMAL
ANTIBODY SCREEN: NORMAL
APTT PPP: 25 SECONDS (ref 27–38)
BASE EXCESS BLDCOA CALC-SCNC: -2.8 MMOL/L (ref -10.8–-0.5)
BASE EXCESS BLDCOV CALC-SCNC: -2.1 MMOL/L (ref -8.1–-0.5)
BILIRUBIN, POC: NEGATIVE
BLOOD URINE, POC: POSITIVE
BODY TEMPERATURE: 37 DEGREES CELSIUS
BODY TEMPERATURE: 37 DEGREES CELSIUS
CLARITY, POC: CLEAR
COLOR, POC: YELLOW
ERYTHROCYTE [DISTWIDTH] IN BLOOD BY AUTOMATED COUNT: 13 % (ref 11.5–14.5)
FIBRINOGEN PPP-MCNC: 451 MG/DL (ref 200–400)
GLUCOSE BLD MANUAL STRIP-MCNC: 103 MG/DL (ref 74–99)
GLUCOSE BLD MANUAL STRIP-MCNC: 123 MG/DL (ref 74–99)
GLUCOSE BLD MANUAL STRIP-MCNC: 66 MG/DL (ref 74–99)
GLUCOSE BLD MANUAL STRIP-MCNC: 72 MG/DL (ref 74–99)
GLUCOSE BLD MANUAL STRIP-MCNC: 79 MG/DL (ref 74–99)
GLUCOSE BLD MANUAL STRIP-MCNC: 81 MG/DL (ref 74–99)
GLUCOSE URINE, POC: NEGATIVE
HCO3 BLDCOA-SCNC: 27.1 MMOL/L (ref 15–29)
HCO3 BLDCOV-SCNC: 26 MMOL/L (ref 16–26)
HCT VFR BLD AUTO: 32.3 % (ref 36–46)
HGB BLD-MCNC: 10.8 G/DL (ref 12–16)
INHALED O2 CONCENTRATION: 21 %
INHALED O2 CONCENTRATION: 21 %
INR PPP: 0.9 (ref 0.9–1.1)
KETONES, POC: NEGATIVE
LEUKOCYTE EST, POC: ABNORMAL
MCH RBC QN AUTO: 32.3 PG (ref 26–34)
MCHC RBC AUTO-ENTMCNC: 33.4 G/DL (ref 32–36)
MCV RBC AUTO: 97 FL (ref 80–100)
NITRITE, POC: NEGATIVE
NRBC BLD-RTO: 0 /100 WBCS (ref 0–0)
OXYHGB MFR BLDCOA: 6.5 % (ref 94–98)
OXYHGB MFR BLDCOV: 15.4 % (ref 94–98)
PCO2 BLDCOA: 71 MM HG (ref 31–75)
PCO2 BLDCOV: 58 MM HG (ref 22–53)
PH BLDCOA: 7.19 PH (ref 7.08–7.39)
PH BLDCOV: 7.26 PH (ref 7.19–7.47)
PH, POC: 5.5
PLATELET # BLD AUTO: 273 X10*3/UL (ref 150–450)
PO2 BLDCOA: <6 MM HG (ref 5–31)
PO2 BLDCOV: 12 MM HG (ref 13–37)
POC APPEARANCE OF BODY FLUID: ABNORMAL
PROTHROMBIN TIME: 10.4 SECONDS (ref 9.8–12.8)
RBC # BLD AUTO: 3.34 X10*6/UL (ref 4–5.2)
RH FACTOR (ANTIGEN D): NORMAL
SAO2 % BLDCOA: 7 % (ref 3–69)
SAO2 % BLDCOV: 16 % (ref 16–84)
SPECIFIC GRAVITY, POC: 1.03
TREPONEMA PALLIDUM IGG+IGM AB [PRESENCE] IN SERUM OR PLASMA BY IMMUNOASSAY: NONREACTIVE
URINE PROTEIN, POC: ABNORMAL
UROBILINOGEN, POC: 0.2
WBC # BLD AUTO: 8.3 X10*3/UL (ref 4.4–11.3)

## 2024-03-25 PROCEDURE — 01968 ANES/ANALG CS DLVR NEURAXIAL: CPT | Performed by: ANESTHESIOLOGY

## 2024-03-25 PROCEDURE — 7100000016 HC LABOR RECOVERY PER HOUR: Performed by: OBSTETRICS & GYNECOLOGY

## 2024-03-25 PROCEDURE — 82805 BLOOD GASES W/O2 SATURATION: CPT | Performed by: STUDENT IN AN ORGANIZED HEALTH CARE EDUCATION/TRAINING PROGRAM

## 2024-03-25 PROCEDURE — 59514 CESAREAN DELIVERY ONLY: CPT

## 2024-03-25 PROCEDURE — 2500000004 HC RX 250 GENERAL PHARMACY W/ HCPCS (ALT 636 FOR OP/ED): Performed by: STUDENT IN AN ORGANIZED HEALTH CARE EDUCATION/TRAINING PROGRAM

## 2024-03-25 PROCEDURE — 82947 ASSAY GLUCOSE BLOOD QUANT: CPT

## 2024-03-25 PROCEDURE — 3700000014 HC AN EPIDURAL BLOCK CHARGE: Performed by: OBSTETRICS & GYNECOLOGY

## 2024-03-25 PROCEDURE — 2500000004 HC RX 250 GENERAL PHARMACY W/ HCPCS (ALT 636 FOR OP/ED): Performed by: NURSE ANESTHETIST, CERTIFIED REGISTERED

## 2024-03-25 PROCEDURE — 59050 FETAL MONITOR W/REPORT: CPT

## 2024-03-25 PROCEDURE — 36415 COLL VENOUS BLD VENIPUNCTURE: CPT | Performed by: STUDENT IN AN ORGANIZED HEALTH CARE EDUCATION/TRAINING PROGRAM

## 2024-03-25 PROCEDURE — 01967 NEURAXL LBR ANES VAG DLVR: CPT | Performed by: ANESTHESIOLOGY

## 2024-03-25 PROCEDURE — 59514 CESAREAN DELIVERY ONLY: CPT | Performed by: OBSTETRICS & GYNECOLOGY

## 2024-03-25 PROCEDURE — 2720000007 HC OR 272 NO HCPCS: Performed by: OBSTETRICS & GYNECOLOGY

## 2024-03-25 PROCEDURE — 88307 TISSUE EXAM BY PATHOLOGIST: CPT | Mod: TC,SUR

## 2024-03-25 PROCEDURE — 88307 TISSUE EXAM BY PATHOLOGIST: CPT | Performed by: PATHOLOGY

## 2024-03-25 PROCEDURE — 85610 PROTHROMBIN TIME: CPT | Performed by: STUDENT IN AN ORGANIZED HEALTH CARE EDUCATION/TRAINING PROGRAM

## 2024-03-25 PROCEDURE — 2500000005 HC RX 250 GENERAL PHARMACY W/O HCPCS: Performed by: NURSE ANESTHETIST, CERTIFIED REGISTERED

## 2024-03-25 PROCEDURE — 2500000002 HC RX 250 W HCPCS SELF ADMINISTERED DRUGS (ALT 637 FOR MEDICARE OP, ALT 636 FOR OP/ED): Performed by: STUDENT IN AN ORGANIZED HEALTH CARE EDUCATION/TRAINING PROGRAM

## 2024-03-25 PROCEDURE — 85027 COMPLETE CBC AUTOMATED: CPT | Performed by: STUDENT IN AN ORGANIZED HEALTH CARE EDUCATION/TRAINING PROGRAM

## 2024-03-25 PROCEDURE — 86901 BLOOD TYPING SEROLOGIC RH(D): CPT | Performed by: STUDENT IN AN ORGANIZED HEALTH CARE EDUCATION/TRAINING PROGRAM

## 2024-03-25 PROCEDURE — 86780 TREPONEMA PALLIDUM: CPT | Performed by: STUDENT IN AN ORGANIZED HEALTH CARE EDUCATION/TRAINING PROGRAM

## 2024-03-25 PROCEDURE — 2500000004 HC RX 250 GENERAL PHARMACY W/ HCPCS (ALT 636 FOR OP/ED)

## 2024-03-25 PROCEDURE — 86920 COMPATIBILITY TEST SPIN: CPT

## 2024-03-25 PROCEDURE — 3700000018 HC OB ANESTHESIA C-SECTION: Performed by: OBSTETRICS & GYNECOLOGY

## 2024-03-25 PROCEDURE — 99222 1ST HOSP IP/OBS MODERATE 55: CPT | Performed by: STUDENT IN AN ORGANIZED HEALTH CARE EDUCATION/TRAINING PROGRAM

## 2024-03-25 PROCEDURE — 85384 FIBRINOGEN ACTIVITY: CPT | Performed by: STUDENT IN AN ORGANIZED HEALTH CARE EDUCATION/TRAINING PROGRAM

## 2024-03-25 PROCEDURE — 36415 COLL VENOUS BLD VENIPUNCTURE: CPT

## 2024-03-25 PROCEDURE — 99215 OFFICE O/P EST HI 40 MIN: CPT

## 2024-03-25 PROCEDURE — 7210000002 HC LABOR PER HOUR

## 2024-03-25 PROCEDURE — 1100000001 HC PRIVATE ROOM DAILY

## 2024-03-25 PROCEDURE — 99221 1ST HOSP IP/OBS SF/LOW 40: CPT | Performed by: PEDIATRICS

## 2024-03-25 PROCEDURE — 10907ZC DRAINAGE OF AMNIOTIC FLUID, THERAPEUTIC FROM PRODUCTS OF CONCEPTION, VIA NATURAL OR ARTIFICIAL OPENING: ICD-10-PCS | Performed by: OBSTETRICS & GYNECOLOGY

## 2024-03-25 RX ORDER — TRANEXAMIC ACID 100 MG/ML
1000 INJECTION, SOLUTION INTRAVENOUS ONCE AS NEEDED
Status: DISCONTINUED | OUTPATIENT
Start: 2024-03-25 | End: 2024-03-26 | Stop reason: HOSPADM

## 2024-03-25 RX ORDER — OXYTOCIN 10 [USP'U]/ML
10 INJECTION, SOLUTION INTRAMUSCULAR; INTRAVENOUS ONCE AS NEEDED
Status: DISCONTINUED | OUTPATIENT
Start: 2024-03-25 | End: 2024-03-26 | Stop reason: HOSPADM

## 2024-03-25 RX ORDER — INSULIN LISPRO 100 [IU]/ML
0-10 INJECTION, SOLUTION INTRAVENOUS; SUBCUTANEOUS EVERY 4 HOURS
Status: DISCONTINUED | OUTPATIENT
Start: 2024-03-25 | End: 2024-03-26

## 2024-03-25 RX ORDER — LABETALOL HYDROCHLORIDE 5 MG/ML
20 INJECTION, SOLUTION INTRAVENOUS ONCE AS NEEDED
Status: DISCONTINUED | OUTPATIENT
Start: 2024-03-25 | End: 2024-03-26 | Stop reason: HOSPADM

## 2024-03-25 RX ORDER — INSULIN LISPRO 100 [IU]/ML
4 INJECTION, SOLUTION INTRAVENOUS; SUBCUTANEOUS
Status: DISCONTINUED | OUTPATIENT
Start: 2024-03-25 | End: 2024-03-25

## 2024-03-25 RX ORDER — METOCLOPRAMIDE 10 MG/1
10 TABLET ORAL EVERY 6 HOURS PRN
Status: DISCONTINUED | OUTPATIENT
Start: 2024-03-25 | End: 2024-03-26

## 2024-03-25 RX ORDER — DEXTROSE 40 %
15 GEL (GRAM) ORAL
Status: DISCONTINUED | OUTPATIENT
Start: 2024-03-25 | End: 2024-03-26

## 2024-03-25 RX ORDER — CARBOPROST TROMETHAMINE 250 UG/ML
250 INJECTION, SOLUTION INTRAMUSCULAR ONCE AS NEEDED
Status: DISCONTINUED | OUTPATIENT
Start: 2024-03-25 | End: 2024-03-26 | Stop reason: HOSPADM

## 2024-03-25 RX ORDER — SODIUM CHLORIDE, SODIUM LACTATE, POTASSIUM CHLORIDE, CALCIUM CHLORIDE 600; 310; 30; 20 MG/100ML; MG/100ML; MG/100ML; MG/100ML
125 INJECTION, SOLUTION INTRAVENOUS CONTINUOUS
Status: DISCONTINUED | OUTPATIENT
Start: 2024-03-25 | End: 2024-03-26

## 2024-03-25 RX ORDER — DEXTROSE 40 %
30 GEL (GRAM) ORAL
Status: DISCONTINUED | OUTPATIENT
Start: 2024-03-25 | End: 2024-03-25

## 2024-03-25 RX ORDER — PHENYLEPHRINE HCL IN 0.9% NACL 0.4MG/10ML
SYRINGE (ML) INTRAVENOUS AS NEEDED
Status: DISCONTINUED | OUTPATIENT
Start: 2024-03-25 | End: 2024-03-25

## 2024-03-25 RX ORDER — INSULIN GLARGINE 100 [IU]/ML
8 INJECTION, SOLUTION SUBCUTANEOUS NIGHTLY
Status: DISCONTINUED | OUTPATIENT
Start: 2024-03-25 | End: 2024-03-25

## 2024-03-25 RX ORDER — METOCLOPRAMIDE HYDROCHLORIDE 5 MG/ML
10 INJECTION INTRAMUSCULAR; INTRAVENOUS EVERY 6 HOURS PRN
Status: DISCONTINUED | OUTPATIENT
Start: 2024-03-25 | End: 2024-03-26

## 2024-03-25 RX ORDER — LIDOCAINE HYDROCHLORIDE 10 MG/ML
0.5 INJECTION INFILTRATION; PERINEURAL ONCE AS NEEDED
Status: DISCONTINUED | OUTPATIENT
Start: 2024-03-25 | End: 2024-03-25

## 2024-03-25 RX ORDER — ONDANSETRON 4 MG/1
4 TABLET, FILM COATED ORAL EVERY 6 HOURS PRN
Status: DISCONTINUED | OUTPATIENT
Start: 2024-03-25 | End: 2024-03-26

## 2024-03-25 RX ORDER — LABETALOL HYDROCHLORIDE 5 MG/ML
20 INJECTION, SOLUTION INTRAVENOUS ONCE AS NEEDED
Status: DISCONTINUED | OUTPATIENT
Start: 2024-03-25 | End: 2024-03-25

## 2024-03-25 RX ORDER — INSULIN LISPRO 100 [IU]/ML
6 INJECTION, SOLUTION INTRAVENOUS; SUBCUTANEOUS
Status: DISCONTINUED | OUTPATIENT
Start: 2024-03-25 | End: 2024-03-25

## 2024-03-25 RX ORDER — DEXTROSE 40 %
30 GEL (GRAM) ORAL
Status: DISCONTINUED | OUTPATIENT
Start: 2024-03-25 | End: 2024-03-26

## 2024-03-25 RX ORDER — PENICILLIN G 3000000 [IU]/50ML
3 INJECTION, SOLUTION INTRAVENOUS EVERY 4 HOURS
Status: DISCONTINUED | OUTPATIENT
Start: 2024-03-25 | End: 2024-03-26

## 2024-03-25 RX ORDER — MORPHINE SULFATE 0.5 MG/ML
INJECTION, SOLUTION EPIDURAL; INTRATHECAL; INTRAVENOUS AS NEEDED
Status: DISCONTINUED | OUTPATIENT
Start: 2024-03-25 | End: 2024-03-25

## 2024-03-25 RX ORDER — DEXTROSE 40 %
15 GEL (GRAM) ORAL
Status: DISCONTINUED | OUTPATIENT
Start: 2024-03-25 | End: 2024-03-25

## 2024-03-25 RX ORDER — OXYTOCIN/0.9 % SODIUM CHLORIDE 30/500 ML
2-30 PLASTIC BAG, INJECTION (ML) INTRAVENOUS CONTINUOUS
Status: DISCONTINUED | OUTPATIENT
Start: 2024-03-25 | End: 2024-03-26

## 2024-03-25 RX ORDER — KETOROLAC TROMETHAMINE 30 MG/ML
INJECTION, SOLUTION INTRAMUSCULAR; INTRAVENOUS AS NEEDED
Status: DISCONTINUED | OUTPATIENT
Start: 2024-03-25 | End: 2024-03-25

## 2024-03-25 RX ORDER — FENTANYL CITRATE 50 UG/ML
50 INJECTION, SOLUTION INTRAMUSCULAR; INTRAVENOUS ONCE
Status: COMPLETED | OUTPATIENT
Start: 2024-03-25 | End: 2024-03-25

## 2024-03-25 RX ORDER — DEXTROSE 50 % IN WATER (D50W) INTRAVENOUS SYRINGE
25
Status: DISCONTINUED | OUTPATIENT
Start: 2024-03-25 | End: 2024-03-26

## 2024-03-25 RX ORDER — OXYTOCIN/0.9 % SODIUM CHLORIDE 30/500 ML
60 PLASTIC BAG, INJECTION (ML) INTRAVENOUS ONCE AS NEEDED
Status: DISCONTINUED | OUTPATIENT
Start: 2024-03-25 | End: 2024-03-26 | Stop reason: HOSPADM

## 2024-03-25 RX ORDER — TERBUTALINE SULFATE 1 MG/ML
0.25 INJECTION SUBCUTANEOUS ONCE AS NEEDED
Status: DISCONTINUED | OUTPATIENT
Start: 2024-03-25 | End: 2024-03-26 | Stop reason: HOSPADM

## 2024-03-25 RX ORDER — LOPERAMIDE HYDROCHLORIDE 2 MG/1
4 CAPSULE ORAL EVERY 2 HOUR PRN
Status: DISCONTINUED | OUTPATIENT
Start: 2024-03-25 | End: 2024-03-26 | Stop reason: HOSPADM

## 2024-03-25 RX ORDER — METHYLERGONOVINE MALEATE 0.2 MG/ML
0.2 INJECTION INTRAVENOUS ONCE AS NEEDED
Status: DISCONTINUED | OUTPATIENT
Start: 2024-03-25 | End: 2024-03-26 | Stop reason: HOSPADM

## 2024-03-25 RX ORDER — ONDANSETRON 4 MG/1
4 TABLET, FILM COATED ORAL EVERY 6 HOURS PRN
Status: DISCONTINUED | OUTPATIENT
Start: 2024-03-25 | End: 2024-03-25

## 2024-03-25 RX ORDER — HYDRALAZINE HYDROCHLORIDE 20 MG/ML
5 INJECTION INTRAMUSCULAR; INTRAVENOUS ONCE AS NEEDED
Status: DISCONTINUED | OUTPATIENT
Start: 2024-03-25 | End: 2024-03-25

## 2024-03-25 RX ORDER — FENTANYL/BUPIVACAINE/NS/PF 2MCG/ML-.1
PLASTIC BAG, INJECTION (ML) INJECTION AS NEEDED
Status: DISCONTINUED | OUTPATIENT
Start: 2024-03-25 | End: 2024-03-25

## 2024-03-25 RX ORDER — CEFAZOLIN 1 G/1
INJECTION, POWDER, FOR SOLUTION INTRAVENOUS AS NEEDED
Status: DISCONTINUED | OUTPATIENT
Start: 2024-03-25 | End: 2024-03-25

## 2024-03-25 RX ORDER — NIFEDIPINE 10 MG/1
10 CAPSULE ORAL ONCE AS NEEDED
Status: DISCONTINUED | OUTPATIENT
Start: 2024-03-25 | End: 2024-03-25

## 2024-03-25 RX ORDER — LIDOCAINE HCL/EPINEPHRINE/PF 2%-1:200K
VIAL (ML) INJECTION AS NEEDED
Status: DISCONTINUED | OUTPATIENT
Start: 2024-03-25 | End: 2024-03-25

## 2024-03-25 RX ORDER — FENTANYL CITRATE 50 UG/ML
INJECTION, SOLUTION INTRAMUSCULAR; INTRAVENOUS AS NEEDED
Status: DISCONTINUED | OUTPATIENT
Start: 2024-03-25 | End: 2024-03-25

## 2024-03-25 RX ORDER — LIDOCAINE HYDROCHLORIDE 10 MG/ML
30 INJECTION INFILTRATION; PERINEURAL ONCE AS NEEDED
Status: DISCONTINUED | OUTPATIENT
Start: 2024-03-25 | End: 2024-03-26 | Stop reason: HOSPADM

## 2024-03-25 RX ORDER — DEXTROSE 50 % IN WATER (D50W) INTRAVENOUS SYRINGE
25
Status: DISCONTINUED | OUTPATIENT
Start: 2024-03-25 | End: 2024-03-25

## 2024-03-25 RX ORDER — NIFEDIPINE 10 MG/1
10 CAPSULE ORAL ONCE AS NEEDED
Status: DISCONTINUED | OUTPATIENT
Start: 2024-03-25 | End: 2024-03-26 | Stop reason: HOSPADM

## 2024-03-25 RX ORDER — INSULIN GLARGINE 100 [IU]/ML
8 INJECTION, SOLUTION SUBCUTANEOUS
Status: DISCONTINUED | OUTPATIENT
Start: 2024-03-25 | End: 2024-03-25

## 2024-03-25 RX ORDER — MISOPROSTOL 200 UG/1
800 TABLET ORAL ONCE AS NEEDED
Status: DISCONTINUED | OUTPATIENT
Start: 2024-03-25 | End: 2024-03-26 | Stop reason: HOSPADM

## 2024-03-25 RX ORDER — ONDANSETRON HYDROCHLORIDE 2 MG/ML
4 INJECTION, SOLUTION INTRAVENOUS EVERY 6 HOURS PRN
Status: DISCONTINUED | OUTPATIENT
Start: 2024-03-25 | End: 2024-03-25

## 2024-03-25 RX ORDER — HYDRALAZINE HYDROCHLORIDE 20 MG/ML
5 INJECTION INTRAMUSCULAR; INTRAVENOUS ONCE AS NEEDED
Status: DISCONTINUED | OUTPATIENT
Start: 2024-03-25 | End: 2024-03-26 | Stop reason: HOSPADM

## 2024-03-25 RX ORDER — FENTANYL/BUPIVACAINE/NS/PF 2MCG/ML-.1
PLASTIC BAG, INJECTION (ML) INJECTION CONTINUOUS PRN
Status: DISCONTINUED | OUTPATIENT
Start: 2024-03-25 | End: 2024-03-25

## 2024-03-25 RX ORDER — ONDANSETRON HYDROCHLORIDE 2 MG/ML
4 INJECTION, SOLUTION INTRAVENOUS EVERY 6 HOURS PRN
Status: DISCONTINUED | OUTPATIENT
Start: 2024-03-25 | End: 2024-03-26

## 2024-03-25 RX ADMIN — DEXAMETHASONE SODIUM PHOSPHATE 4 MG: 4 INJECTION, SOLUTION INTRAMUSCULAR; INTRAVENOUS at 21:31

## 2024-03-25 RX ADMIN — AZITHROMYCIN 500 MG: 500 INJECTION, POWDER, LYOPHILIZED, FOR SOLUTION INTRAVENOUS at 21:09

## 2024-03-25 RX ADMIN — Medication 14 ML/HR: at 20:13

## 2024-03-25 RX ADMIN — LIDOCAINE HYDROCHLORIDE,EPINEPHRINE BITARTRATE 3 ML: 20; .005 INJECTION, SOLUTION EPIDURAL; INFILTRATION; INTRACAUDAL; PERINEURAL at 21:09

## 2024-03-25 RX ADMIN — LIDOCAINE HYDROCHLORIDE,EPINEPHRINE BITARTRATE 5 ML: 20; .005 INJECTION, SOLUTION EPIDURAL; INFILTRATION; INTRACAUDAL; PERINEURAL at 21:01

## 2024-03-25 RX ADMIN — ONDANSETRON 4 MG: 2 INJECTION INTRAMUSCULAR; INTRAVENOUS at 21:09

## 2024-03-25 RX ADMIN — Medication 80 MCG: at 21:17

## 2024-03-25 RX ADMIN — SODIUM CHLORIDE, POTASSIUM CHLORIDE, SODIUM LACTATE AND CALCIUM CHLORIDE 125 ML/HR: 600; 310; 30; 20 INJECTION, SOLUTION INTRAVENOUS at 05:06

## 2024-03-25 RX ADMIN — METOCLOPRAMIDE 10 MG: 5 INJECTION, SOLUTION INTRAMUSCULAR; INTRAVENOUS at 21:31

## 2024-03-25 RX ADMIN — LIDOCAINE HYDROCHLORIDE,EPINEPHRINE BITARTRATE 5 ML: 20; .005 INJECTION, SOLUTION EPIDURAL; INFILTRATION; INTRACAUDAL; PERINEURAL at 21:06

## 2024-03-25 RX ADMIN — LIDOCAINE HYDROCHLORIDE,EPINEPHRINE BITARTRATE 5 ML: 20; .005 INJECTION, SOLUTION EPIDURAL; INFILTRATION; INTRACAUDAL; PERINEURAL at 21:07

## 2024-03-25 RX ADMIN — FENTANYL CITRATE 100 MCG: 50 INJECTION, SOLUTION INTRAMUSCULAR; INTRAVENOUS at 21:06

## 2024-03-25 RX ADMIN — Medication 5 ML: at 20:12

## 2024-03-25 RX ADMIN — Medication 2 MILLI-UNITS/MIN: at 11:15

## 2024-03-25 RX ADMIN — PENICILLIN G 3 MILLION UNITS: 3000000 INJECTION, SOLUTION INTRAVENOUS at 19:00

## 2024-03-25 RX ADMIN — CEFAZOLIN 2 G: 1 INJECTION, POWDER, FOR SOLUTION INTRAMUSCULAR; INTRAVENOUS at 21:11

## 2024-03-25 RX ADMIN — SODIUM CHLORIDE, POTASSIUM CHLORIDE, SODIUM LACTATE AND CALCIUM CHLORIDE 500 ML: 600; 310; 30; 20 INJECTION, SOLUTION INTRAVENOUS at 19:54

## 2024-03-25 RX ADMIN — FENTANYL CITRATE 50 MCG: 50 INJECTION INTRAMUSCULAR; INTRAVENOUS at 10:59

## 2024-03-25 RX ADMIN — INSULIN LISPRO 6 UNITS: 100 INJECTION, SOLUTION INTRAVENOUS; SUBCUTANEOUS at 09:08

## 2024-03-25 RX ADMIN — Medication 80 MCG: at 21:16

## 2024-03-25 RX ADMIN — Medication 80 MCG: at 21:43

## 2024-03-25 RX ADMIN — SODIUM CHLORIDE, SODIUM LACTATE, POTASSIUM CHLORIDE, AND CALCIUM CHLORIDE: 600; 310; 30; 20 INJECTION, SOLUTION INTRAVENOUS at 20:58

## 2024-03-25 RX ADMIN — OXYTOCIN 600 MILLI-UNITS/MIN: 10 INJECTION, SOLUTION INTRAMUSCULAR; INTRAVENOUS at 21:23

## 2024-03-25 RX ADMIN — Medication 5 ML: at 20:07

## 2024-03-25 RX ADMIN — PENICILLIN G POTASSIUM 5 MILLION UNITS: 5000000 INJECTION, POWDER, FOR SOLUTION INTRAMUSCULAR; INTRAVENOUS at 10:59

## 2024-03-25 RX ADMIN — Medication 80 MCG: at 21:20

## 2024-03-25 RX ADMIN — KETOROLAC TROMETHAMINE 30 MG: 30 INJECTION, SOLUTION INTRAMUSCULAR at 21:56

## 2024-03-25 RX ADMIN — INSULIN GLARGINE 8 UNITS: 100 INJECTION, SOLUTION SUBCUTANEOUS at 07:29

## 2024-03-25 RX ADMIN — SODIUM CHLORIDE, POTASSIUM CHLORIDE, SODIUM LACTATE AND CALCIUM CHLORIDE 125 ML/HR: 600; 310; 30; 20 INJECTION, SOLUTION INTRAVENOUS at 15:37

## 2024-03-25 RX ADMIN — Medication 80 MCG: at 21:22

## 2024-03-25 RX ADMIN — Medication 120 MCG: at 21:13

## 2024-03-25 RX ADMIN — MORPHINE SULFATE 2.5 MG: 0.5 INJECTION EPIDURAL; INTRATHECAL; INTRAVENOUS at 21:54

## 2024-03-25 RX ADMIN — PENICILLIN G 3 MILLION UNITS: 3000000 INJECTION, SOLUTION INTRAVENOUS at 15:10

## 2024-03-25 SDOH — SOCIAL STABILITY: SOCIAL INSECURITY: PHYSICAL ABUSE: DENIES

## 2024-03-25 SDOH — HEALTH STABILITY: MENTAL HEALTH: WISH TO BE DEAD (PAST 1 MONTH): NO

## 2024-03-25 SDOH — ECONOMIC STABILITY: HOUSING INSECURITY: DO YOU FEEL UNSAFE GOING BACK TO THE PLACE WHERE YOU ARE LIVING?: NO

## 2024-03-25 SDOH — SOCIAL STABILITY: SOCIAL INSECURITY: VERBAL ABUSE: DENIES

## 2024-03-25 SDOH — SOCIAL STABILITY: SOCIAL INSECURITY: HAVE YOU HAD THOUGHTS OF HARMING ANYONE ELSE?: NO

## 2024-03-25 SDOH — HEALTH STABILITY: MENTAL HEALTH: HAVE YOU USED ANY PRESCRIPTION DRUGS OTHER THAN PRESCRIBED IN THE PAST 12 MONTHS?: NO

## 2024-03-25 SDOH — SOCIAL STABILITY: SOCIAL INSECURITY: ARE THERE ANY APPARENT SIGNS OF INJURIES/BEHAVIORS THAT COULD BE RELATED TO ABUSE/NEGLECT?: NO

## 2024-03-25 SDOH — SOCIAL STABILITY: SOCIAL INSECURITY: ARE YOU OR HAVE YOU BEEN THREATENED OR ABUSED PHYSICALLY, EMOTIONALLY, OR SEXUALLY BY ANYONE?: NO

## 2024-03-25 SDOH — SOCIAL STABILITY: SOCIAL INSECURITY: DOES ANYONE TRY TO KEEP YOU FROM HAVING/CONTACTING OTHER FRIENDS OR DOING THINGS OUTSIDE YOUR HOME?: NO

## 2024-03-25 SDOH — SOCIAL STABILITY: SOCIAL INSECURITY: HAS ANYONE EVER THREATENED TO HURT YOUR FAMILY OR YOUR PETS?: NO

## 2024-03-25 SDOH — HEALTH STABILITY: MENTAL HEALTH: SUICIDAL BEHAVIOR (LIFETIME): NO

## 2024-03-25 SDOH — HEALTH STABILITY: MENTAL HEALTH: HAVE YOU USED ANY SUBSTANCES (CANABIS, COCAINE, HEROIN, HALLUCINOGENS, INHALANTS, ETC.) IN THE PAST 12 MONTHS?: NO

## 2024-03-25 SDOH — SOCIAL STABILITY: SOCIAL INSECURITY: DO YOU FEEL ANYONE HAS EXPLOITED OR TAKEN ADVANTAGE OF YOU FINANCIALLY OR OF YOUR PERSONAL PROPERTY?: NO

## 2024-03-25 SDOH — HEALTH STABILITY: MENTAL HEALTH: NON-SPECIFIC ACTIVE SUICIDAL THOUGHTS (PAST 1 MONTH): NO

## 2024-03-25 SDOH — SOCIAL STABILITY: SOCIAL INSECURITY: ABUSE SCREEN: ADULT

## 2024-03-25 SDOH — HEALTH STABILITY: MENTAL HEALTH: WERE YOU ABLE TO COMPLETE ALL THE BEHAVIORAL HEALTH SCREENINGS?: YES

## 2024-03-25 ASSESSMENT — PAIN SCALES - GENERAL
PAINLEVEL_OUTOF10: 0 - NO PAIN
PAINLEVEL_OUTOF10: 6
PAINLEVEL: 4
PAINLEVEL_OUTOF10: 5 - MODERATE PAIN
PAINLEVEL_OUTOF10: 0 - NO PAIN
PAINLEVEL_OUTOF10: 6
PAINLEVEL_OUTOF10: 0 - NO PAIN
PAINLEVEL_OUTOF10: 5 - MODERATE PAIN
PAINLEVEL_OUTOF10: 0 - NO PAIN

## 2024-03-25 ASSESSMENT — PATIENT HEALTH QUESTIONNAIRE - PHQ9
SUM OF ALL RESPONSES TO PHQ9 QUESTIONS 1 & 2: 0
1. LITTLE INTEREST OR PLEASURE IN DOING THINGS: NOT AT ALL
2. FEELING DOWN, DEPRESSED OR HOPELESS: NOT AT ALL

## 2024-03-25 ASSESSMENT — LIFESTYLE VARIABLES
SKIP TO QUESTIONS 9-10: 1
HOW MANY STANDARD DRINKS CONTAINING ALCOHOL DO YOU HAVE ON A TYPICAL DAY: PATIENT DOES NOT DRINK
HOW OFTEN DO YOU HAVE A DRINK CONTAINING ALCOHOL: NEVER
HOW OFTEN DO YOU HAVE 6 OR MORE DRINKS ON ONE OCCASION: NEVER
AUDIT-C TOTAL SCORE: 0
AUDIT-C TOTAL SCORE: 0

## 2024-03-25 ASSESSMENT — ACTIVITIES OF DAILY LIVING (ADL): LACK_OF_TRANSPORTATION: YES

## 2024-03-25 NOTE — ANESTHESIA PREPROCEDURE EVALUATION
Patient: Fiorella Leon    Evaluation Method: In-person visit    Procedure Information    Date: 03/25/24  Procedure: Labor Consult         Relevant Problems   Anesthesia (within normal limits)      Cardiovascular (within normal limits)      Endocrine   (+) Insulin controlled gestational diabetes mellitus (GDM) in third trimester   (+) Obesity complicating pregnancy, childbirth, or puerperium, antepartum      GI   (+) Gastroesophageal reflux disease      /Renal (within normal limits)      Neuro/Psych (within normal limits)      Pulmonary (within normal limits)      Hematology (within normal limits)      Musculoskeletal (within normal limits)       Clinical information reviewed:   Tobacco  Allergies  Meds   Med Hx  Surg Hx   Fam Hx  Soc Hx        NPO Detail:  No data recorded     OB/Gyn Evaluation    Present Pregnancy    Patient is pregnant now.  (+) , gestational diabetes   Obstetric History                Physical Exam    Airway  Mallampati: III  TM distance: >3 FB     Cardiovascular - normal exam     Dental        Pulmonary - normal exam     Abdominal            Anesthesia Plan    History of general anesthesia?: yes  History of complications of general anesthesia?: no    ASA 3     epidural     Anesthetic plan and risks discussed with patient.  Use of blood products discussed with patient who consented to blood products.    Plan discussed with resident.

## 2024-03-25 NOTE — ANESTHESIA PROCEDURE NOTES
Epidural Block    Patient location during procedure: OB  Start time: 3/25/2024 7:53 PM  End time: 3/25/2024 8:10 PM  Reason for block: labor analgesia  Staffing  Performed: SRNA   Authorized by: KYLE Troncoso    Performed by: KYLE Troncoso    Preanesthetic Checklist  Completed: patient identified, IV checked, risks and benefits discussed, surgical consent, pre-op evaluation, timeout performed and sterile techniques followed  Block Timeout  RN/Licensed healthcare professional reads aloud to the Anesthesia provider and entire team: Patient identity, procedure with side and site, patient position, and as applicable the availability of implants/special equipment/special requirements.  Patient on coagulant treatment: no  Timeout performed at: 3/25/2024 7:53 PM  Block Placement  Patient position: sitting  Prep: ChloraPrep  Sterility prep: cap, drape, gloves, hand and mask  Sedation level: no sedation  Patient monitoring: blood pressure, continuous pulse oximetry and heart rate  Approach: midline  Local numbing: lidocaine 1% to skin and subcutaneous tissues  Vertebral space: lumbar  Lumbar location: L3-L4  Epidural  Loss of resistance technique: saline  Guidance: landmark technique        Needle  Needle gauge: 17  Needle length: 8.9cm  Needle insertion depth: 6.5 cm  Catheter type: multi-orifice  Catheter size: 19 G  Catheter at skin depth: 11 cm  Catheter securement method: clear occlusive dressing    Test dose: lidocaine 1.5% with epinephrine 1-to-200,000  Test dose: lidocaine 1.5% with epinephrine 1-to-200,000  Test dose result: no positive test dose    PCEA  Medication concentration used: 0.044% Bupivacaine with 1.25 mcg/mL Fentanyl and 1:[id number] Epinephrine  Dose (mL): 10  Lockout (minutes): 15  1-Hour Limit (boluses/hr): 4  Basal Rate: 14        Assessment  Number of attempts: 1  Events: no positive test dose  Procedure assessment: patient tolerated procedure well with no immediate  complications  Additional Notes  X1 attempt by CTAA Rider without complication. Pt OPAL after placement.

## 2024-03-25 NOTE — SIGNIFICANT EVENT
S: CRB out. Patient amendable to AROM. Still comfortable.     O:   FHR: 145/mod/+accel/+small variable decel   Altha: q3 mins   SVE: 3/50/-3, arom'd for clear     A/P:     IOL  - Induction in s/o c/f chronic abruption. Last hgb 10.8, T&C x1u PRBC   - S/p CRB. Pit started at 1115, AROM'd for clear at 1500  - Latent labor   - CEFM, Cat 2 currently in setting of variable decels though overall reassuring with low suspicion for acid-base disturbance of the fetus given moderate variability and accelerations, continue to monitor    GDMA2  - S/p Lantus 8u AM, Lispro 6u with breakfast   - Q4 hour POCT BG checks, SSI  ordered    D/w Dr. Korey Saha MD, PGY2

## 2024-03-25 NOTE — CONSULTS
"NICU PRENATAL CONSULT NOTE   Fiorella Leon is a 39 y.o.  with ANA CRISTINA 2024, by Ultrasound presenting with chronic abruption.    Requesting Physician/Service:  Javon/STEVE  Consulting Physician/Service: Nock/Neonatology    Reason for Consultation: delivery at 34 5/7 weeks today for chronic abruption    Pregnancy Complications: Chronic abruption, intermittent late decels, GDMA2, GBS+    Prenatal labs:   Lab Results   Component Value Date    ABO A 2024    LABRH POS 2024    ABSCRN NEG 2024    RUBIG Positive 10/30/2023     Toxicology:   No results found for: \"AMPHETAMINE\", \"MAMPHBLDS\", \"BARBITURATE\", \"BARBSCRNUR\", \"BENZODIAZ\", \"BENZO\", \"BUPRENBLDS\", \"CANNABBLDS\", \"CANNABINOID\", \"COCBLDS\", \"COCAI\", \"METHABLDS\", \"METH\", \"OXYBLDS\", \"OXYCODONE\", \"PCPBLDS\", \"PCP\", \"OPIATBLDS\", \"OPIATE\", \"FENTANYL\", \"DRBLDCOMM\"  Labs:  Lab Results   Component Value Date    GRPBSTREP (A) 2024     Isolated: Streptococcus agalactiae (Group B Streptococcus)    HIV1X2 Nonreactive 2024    HEPBSAG Nonreactive 10/30/2023    HEPCAB Nonreactive 10/30/2023    NEISSGONOAMP Negative 10/09/2023    CHLAMTRACAMP Negative 10/09/2023    SYPHT Nonreactive 2024     Fetal Imaging:  3/19/24 GDMA2 on Insulin with BMI 33. She is AMA 39 with rr cfDNA.  -Normal interval fetal growth with high normal biometry  -No malformations were identified on a limited survey    12/15/23 A targeted anatomic survey was indicated due to AMA and class I obesity. She has risk reducing cffDNA. She also has multiple prior  births.     -Fetal biometry is consistent with the stated gestational age  -Detailed anatomic evaluation of the fetal brain/ventricles, face, heart/outflow tracts and chest anatomy, abdominal organ specific anatomy, number/length/architecture of  limbs and detailed evaluation of the umbilical cord and placenta and other fetal anatomy as clinically indicated was performed.  -No malformations were identified on this " "comprehensive survey within limitations of sonographic evaluation at this gestational age.  -Transvaginal ultrasound performed for cervical length screening, the cervix is long and closed with no dynamic changes.    Medical History  She has a past medical history of History of placenta abruption (2023) and Pregnancy headache, antepartum (10/10/2023).     Family History  Family History   Problem Relation Name Age of Onset    Hypertension Mother      Diabetes type II Mother      Diabetes Other grandmother     Hypertension Other grandmother     Stroke Other grandmother         Social History  She reports that she has never smoked. She has never used smokeless tobacco. She reports that she does not drink alcohol and does not use drugs.      Assessment/Recommendations:  I spoke with Ms. Leon in her room on L&D.  She is aware that delivery is planned for today.  She is expecting a boy named \"Edy\".  Her cousins were present at the end of our conversation, one cousin had a prior  baby in the [facility] 16yrs ago. Ms. Leon has had prior 32 week and 34 week deliveries, however her most recent delivery was 16yrs ago.   Resuscitation and ICN care plans and expectations for hospital course were discussed including:  RDS/BPD/Ventilator, OG feedings/NEC/use of breastmilk , risk of infection, possible blood transfusion, expected length of stay, risk for hypoglycemia will possible need for increased dextrose content IVF and central line (umbilical/PICC), and apnea/monitoring.    She plans on breastfeeding and declines DHM - would rather use formula despite benefits of human milk for  infants explained.  We discussed that at 34 weeks, formula that would be used would likely be Enfacare or Neosure. Discussed early pumping ASAP after delivery and frequently about q2-3hrs.    Patient made aware that Neonatology will be present for delivery and that we remain available for any questions/concerns that might " arise. Thank you.     Electronically signed by:  Hillary Christian MD     I spent 45 minutes for the consult with at least half the time being face to face with the patient.

## 2024-03-25 NOTE — SIGNIFICANT EVENT
MFM recs made to move forward with IOL given concern for abruption given abdominal pain, vaginal bleeding and recurrent late decels on FHT. IOL started with CRB and Pitocin. PCN for GBS positivity. Will assess for AROM when CRB dispelled. cEFM, Cat 1.     D/w Dr. Korey Saha MD, PGY-2

## 2024-03-25 NOTE — ED TRIAGE NOTES
pt is 34 wks had a tear back in February had some bleeding but since then no more bleeding until today pt starting having abdominal pain with bright red bleeding      is due date

## 2024-03-25 NOTE — CARE PLAN
The clinical goals for the shift include Vaginal Delivery  Pt admitted for Prolonged monitoring. MD decision for induction. Pt s/p CRB now on pitocin. FHR remains overall reassuring.

## 2024-03-25 NOTE — ANESTHESIA PREPROCEDURE EVALUATION
Patient: Fiorella Leon    Evaluation Method: In-person visit    Procedure Information    Date: 03/25/24  Procedure: Labor Consult         Relevant Problems   Anesthesia (within normal limits)      Cardiovascular (within normal limits)      Endocrine   (+) Insulin controlled gestational diabetes mellitus (GDM) in third trimester   (+) Obesity complicating pregnancy, childbirth, or puerperium, antepartum      GI   (+) Gastroesophageal reflux disease      /Renal (within normal limits)      Neuro/Psych (within normal limits)      Pulmonary (within normal limits)      Hematology (within normal limits)      Musculoskeletal (within normal limits)       Clinical information reviewed:   Tobacco  Allergies  Meds   Med Hx  Surg Hx   Fam Hx  Soc Hx        NPO Detail:  No data recorded     OB/Gyn Evaluation    Present Pregnancy    Patient is pregnant now.  (+) , gestational diabetes   Obstetric History                Physical Exam    Airway  Mallampati: III  TM distance: >3 FB     Cardiovascular - normal exam     Dental        Pulmonary - normal exam     Abdominal            Anesthesia Plan    History of general anesthesia?: yes  History of complications of general anesthesia?: no    ASA 2     epidural     Anesthetic plan and risks discussed with patient.  Use of blood products discussed with patient who consented to blood products.    Plan discussed with resident.

## 2024-03-25 NOTE — H&P
Obstetrical Admission History and Physical    Assessment/Plan    38 yo  at 34.5 by 13 wk US who presents for spotting in setting of known abruption.     C/f chronic abruption  - admitted  and received BMZ -  - US showed 1.7 x 0.5 cm subchorionic hematoma at that time  - Bedside US showed MVP 7.8cm, KRYSTLE 18, unable to visualize subchorionic hematoma or thickened placenta  - CBC Hgb 10.8 from 11.3 prior, plts stable, coags and fibrinogen wnl  - T+C1u pRBCs  - Few subtle non recurrent lates on NST, for prolonged monitoring on LD  - Will repeat SSUS, BPP in AM    GDMA2  - Continue Lantus , Lispro  with meals  - Last growth US 3/19 2612 80% AC 92%. Last KRYSTLE: 19.9, MVP 6.5.  - Fasting and postprandials ordered    Routine  - GBS positive   - BCM: interval IUD  - Daily NSTs    Dispo: admit for prolonged monitoring on LD, possible to antepartum pending prolonged monitoring    Ana Anderson MD   Seen and d/w Dr. Garcia      Medical Problems       Problem List       * (Principal) Vaginal bleeding    History of pre-eclampsia    Overview Addendum 2023  9:09 PM by SUN Magana-BARBIEM     - PEC in first pregnancy  - baseline HELLP labs WNL, baseline P:C = 0.08  - Continue bASA 162mg every day          Constipation during pregnancy in first trimester    Antepartum multigravida of advanced maternal age    Overview Addendum 2023 10:20 PM by SUN Curtis-CNP     -s/p genetics  -cfDNA RR           History of  delivery, currently pregnant    Overview Addendum 2024  1:30 PM by Erika Pearl MD     30 and 36w PTB  Counseled at initial [facility] visit re: recurrence risk and symptom precautions   S/p cervical lengths from 16wks to 22 wks. wnl            Obesity complicating pregnancy, childbirth, or puerperium, antepartum    Overview Addendum 2024 12:54 PM by Jannie Masters MD     BMI=31 at NOB  16 wk visit, BMI 35         Supervision of high risk elderly  multigravida in second trimester    Overview Addendum 3/12/2024 11:36 AM by Lisa Henao MD     Declines COVID, s/p Flu and Tdap  PPBC: Mirena interval         Placental abruption, antepartum, unspecified trimester    Overview Addendum 2024  5:05 PM by Erika Mejía MD     Inpatient admission -  SSUS showed evidence of placental abruption  - Received BMZ  -    - Vaginal bleeding resolved as of           Insulin controlled gestational diabetes mellitus (GDM) in third trimester    Overview Addendum 3/12/2024 11:39 AM by Lisa Henao MD     Diagnosed at 16w with A1c of 7.0  [X] ASA  [X] MFM consult - inpatient   [ ] Serial growth  [x] Nutrition  [x] Boot Camp    Last ultrasound:    Fetal surveillance:  [ ] Twice weekly at 32 weeks    Current Regimen:    Delivery Plan:  Recommended gestational age: pending 36 week follow up visit  Intrapartum:  GDM protocol  Postpartum: No medication    Lantus 8/8, Lispro 4/4 TID started during inpatient admission for vaginal bleeding  3/12: Will change to Lantus 8/8 and Lispro 6/4/4. Lispro 6 only when having higher carb breakfast.         34 weeks gestation of pregnancy    Overview Signed 3/12/2024 11:45 AM by Lisa Henao MD     Dating: by 13.5 wk US   [x] Initial BMI: 34  [x] Prenatal Labs: reviewed  [x] Aneuploidy Screening: neg cfDNA, s/p genetics for AMA  [x] Baby ASA: rx   [x] Anatomy US: reviewed  [x] 1hr GCT, CBC, and sylphis at 24-28wks: elevated 1hr.  [x] Tdap (27-36wks):   [x] Flu vaccine: 23  [x] COVID vaccine: Declines  [x] Rhogam (if Rh neg): A POS, not indicated  [] GBS at 36 wks:  [] Breastfeeding - needs pump! Now has insurance  [x] PPBC:  ppMirena   [x] Mode of delivery:  anticipate vaginal                Subjective   38 yo  at 34.5 by 13 wk US who presents for spotting.    Since she was discharged after dx of chronic abruption in Feb has had no vaginal bleeding. Tonight she woke from sleep and had bright red  bleeding into toilet. Difficult for her to quantify how much bleeding, but estimates it would be enough to soak at least half a panty liner. Then dime sized amount of bright red bleeding when she wiped. No ongoing bleeding on her way to triage, no leakage of fluid. Good fetal movement. Contractions q10 minutes, which is not new for her. No abdominal trauma or recent intercourse.     Pregnancy c/b:  - c/f chronic placental abruption, admitted  and received BMZ -. US showed 1.7 x 0.5 cm subchorionic hematoma at that time. Last growth US 3/19 2612 80% AC 92%.  Last KRYSTLE: 19.9, MVP 6.5.  - GDMA2- Lantus , Lispro .   - h/o 16 week loss, likely due to abruption  - AMA, ff cfDNA  - h/o PTB x2- 30, 36 wga, PPROM at 30 weeks and PEC/ spontaneous labor  - h/o PEC on bASA    Obstetrical History   OB History    Para Term  AB Living   5 2 0 2 2 2   SAB IAB Ectopic Multiple Live Births   2 0 0 0 2      # Outcome Date GA Lbr Jayce/2nd Weight Sex Delivery Anes PTL Lv   5 Current            4 SAB 2023 8w0d          3   30w0d   M Vag-Spont  Y ELIZABETH   2   36w0d  2.948 kg M Vag-Spont EPI N ELIZABETH      Complications: Preeclampsia   1 SAB  16w0d   F          Complications: Abruptio Placenta       Past Medical History  Past Medical History:   Diagnosis Date    History of placenta abruption 2023    Pregnancy headache, antepartum 10/10/2023        Past Surgical History   Past Surgical History:   Procedure Laterality Date    MOUTH SURGERY  2017    Oral Surgery Tooth Extraction       Social History  Social History     Tobacco Use    Smoking status: Never    Smokeless tobacco: Never   Substance Use Topics    Alcohol use: Never     Substance and Sexual Activity   Drug Use Never       Allergies  Patient has no known allergies.     Medications  Medications Prior to Admission   Medication Sig Dispense Refill Last Dose    alcohol swabs pads, medicated Use 1, up to 5 times a day 200  "each 3     aspirin 81 mg EC tablet Take 2 tablets (162 mg) by mouth once daily. 60 tablet 11 3/24/2024    blood sugar diagnostic (Accu-Chek Guide test strips) strip Use 1 strip to test sugar fasting and 1 hour after each meal. 4-6 times/day during pregnancy 150 each 3     blood-glucose meter (Accu-Chek Guide Glucose Meter) misc Use for testing blood sugar in pregnancy 1 each 0     docusate sodium (Colace) 100 mg capsule Take 1 capsule (100 mg) by mouth 2 times a day as needed for constipation. (Patient not taking: Reported on 3/25/2024) 30 capsule 4 More than a month    insulin glargine (Lantus Solostar U-100 Insulin) 100 unit/mL (3 mL) pen Inject 8 units in the morning before breakfast and at bedtime. 5 each 3 3/24/2024    insulin lispro (HumaLOG KwikPen Insulin) 100 unit/mL injection Inject 4 units before each meal 3 times daily. 5 each 3 3/24/2024    lancets misc Use 1 lancet 4-6 times per day during pregnancy 200 each 3     metoclopramide (Reglan) 10 mg tablet Take 1 tablet (10 mg) by mouth every 6 hours. (Patient not taking: Reported on 3/25/2024) 30 tablet 1 More than a month    pen needle, diabetic (Pen Needle) 32 gauge x 5/32\" needle Use 1 per injection, up to 5 times a day 200 each 3     PNV no.95/ferrous fum/folic ac (PRENATAL MULTIVITAMINS ORAL) Take by mouth.   3/24/2024       Objective    Last Vitals  Temp Pulse Resp BP MAP O2 Sat   36.2 °C (97.2 °F) 85 (Simultaneous filing. User may not have seen previous data.) 18 110/71 (Simultaneous filing. User may not have seen previous data.)   97 % (Simultaneous filing. User may not have seen previous data.)     Physical Examination  General: no acute distress  HEENT: normocephalic, atraumatic  Heart: warm and well perfused  Lungs: breathing comfortably on room air  Abdomen: gravid, soft, non tender  SSE: negative for bleeding, negative nitrazine, ferning, pooling  SVE: fingertip, thick   Extremities: moving all extremities  Neuro: awake and conversant  Psych: " appropriate mood and affect    Cervical Exam  Dilation: Fingertip  Method: Sterile speculum  OB Examiner: AMANDA Anderson  Fetal Assessment  Mode: External US     NST: 140, mod, + accels, non recurrent subtle late decels

## 2024-03-25 NOTE — PROGRESS NOTES
Antepartum Progress Note    Assessment/Plan   40 yo  at 34.5 by 13 wk US who presents for spotting in setting of known abruption.      C/f chronic abruption  - admitted  and received BMZ -  - US showed 1.7 x 0.5 cm subchorionic hematoma at that time  - Bedside US showed MVP 7.8cm, KRYSTLE 18, unable to visualize subchorionic hematoma or thickened placenta  - CBC Hgb 10.8 from 11.3 prior, plts stable, coags and fibrinogen wnl  - T+C1u pRBCs  - FHT remarkable for intermittent late decels, planning to continue cEFM and SSUS this morning  - Pending MFM recs      GDMA2  - Continue Lantus , Lispro  with meals  - Last growth US 3/19 2612 80% AC 92%. Last KRYSTLE: 19.9, MVP 6.5.  - Fasting and 1hr postprandials ordered     Routine  - GBS positive   - BCM: interval IUD  - Daily NSTs  - NICU consult pending delivery planning     D/w Dr. Korey Saha MD, PGY-2    Principal Problem:    Vaginal bleeding    Subjective   Overall feeling okay this morning. Not feeling contractions    Objective   Last Vitals:  Temp Pulse Resp BP MAP Pulse Ox   37 °C (98.6 °F) 80 19 113/69   (!) 93 % (BP duff inflated)     Vitals Min/Max Last 24 Hours:  Temp  Min: 36.2 °C (97.2 °F)  Max: 37 °C (98.6 °F)  Pulse  Min: 74  Max: 93  Resp  Min: 16  Max: 19  BP  Min: 110/71  Max: 127/76    Intake/Output:    Intake/Output Summary (Last 24 hours) at 3/25/2024 0823  Last data filed at 3/25/2024 0722  Gross per 24 hour   Intake 283.33 ml   Output --   Net 283.33 ml       Physical Examination:  Constitutional: no visible distress, alert and cooperative  Eyes: clear sclera  Head/Neck: normocephalic  Respiratory/Thorax: normal respiratory effort on RA  Cardiovascular: regular rate   Gastrointestinal: abdomen soft, gravid  Musculoskeletal: grossly normal ROM  Neurological: no gross deficits appreciated   Psychological: Appropriate mood and behavior  Skin: warm, dry, no lesions    FHR: 140/mod/+accel/+ intermittent late decels      Lab Review:  Lab Results   Component Value Date    ABO A 03/25/2024    LABRH POS 03/25/2024    ABSCRN NEG 03/25/2024     Lab Results   Component Value Date    WBC 8.3 03/25/2024    HGB 10.8 (L) 03/25/2024    HCT 32.3 (L) 03/25/2024     03/25/2024

## 2024-03-25 NOTE — CONSULTS
MFM Consult    Reason for Consult: abruption, bleeding, NRFHT    HPI:   40 yo  at 34w5d by 13 wk US who presented for spotting. Known chronic abruption, since discharge after diagnosis and workup in Feb, has had no vaginal bleeding. Overnight she woke from sleep and had bright red bleeding into toilet. Unsure how much, approx half a panty liner. No leakage of fluid. Good fetal movement. Contractions q10 minutes, which is not new for her. No abdominal trauma or recent intercourse.     Admitted in setting of bleeding and recurrent late decels.    This AM continues to have small amounts of spotting. Has some abd pain, mostly L side. No LOF. Occ ctx. Good FM.     Pregnancy c/b:  - c/f chronic placental abruption, admitted  and received BMZ -. US showed 1.7 x 0.5 cm subchorionic hematoma at that time. Last growth US 3/19 2612 80% AC 92%. Last KRYSTLE: 19.9, MVP 6.5.  - DMA2- Lantus , Lispro .   - h/o 16 week loss, likely due to abruption  - AMA, ff cfDNA  - h/o PTB x2- 30, 36 wga, PPROM at 30 weeks and PEC/ spontaneous labor  - h/o PEC on bASA    Pregnancy Problems (from 10/09/23 to present)       Problem Noted Resolved    34 weeks gestation of pregnancy 3/12/2024 by Lisa Henao MD No    Priority:  Medium      Overview Signed 3/12/2024 11:45 AM by Lisa Henao MD     Dating: by 13.5 wk US   [x] Initial BMI: 34  [x] Prenatal Labs: reviewed  [x] Aneuploidy Screening: neg cfDNA, s/p genetics for AMA  [x] Baby ASA: rx   [x] Anatomy US: reviewed  [x] 1hr GCT, CBC, and sylphis at 24-28wks: elevated 1hr.  [x] Tdap (27-36wks):   [x] Flu vaccine: 23  [x] COVID vaccine: Declines  [x] Rhogam (if Rh neg): A POS, not indicated  [] GBS at 36 wks:  [] Breastfeeding - needs pump! Now has insurance  [x] PPBC:  ppMirena   [x] Mode of delivery:  anticipate vaginal          Insulin controlled gestational diabetes mellitus (GDM) in third trimester 2024 by Lan Alejandro MD No    Priority:   Medium      Overview Addendum 3/12/2024 11:39 AM by Lisa Henao MD     Diagnosed at 16w with A1c of 7.0  [X] ASA  [X] MFM consult - inpatient   [ ] Serial growth  [x] Nutrition  [x] Boot Camp    Last ultrasound:    Fetal surveillance:  [ ] Twice weekly at 32 weeks    Current Regimen:    Delivery Plan:  Recommended gestational age: pending 36 week follow up visit  Intrapartum:  GDM protocol  Postpartum: No medication    Lantus 8/8, Lispro 4/4 TID started during inpatient admission for vaginal bleeding  3/12: Will change to Lantus 8/8 and Lispro 6/4/4. Lispro 6 only when having higher carb breakfast.         Placental abruption, antepartum, unspecified trimester 2024 by Nima Gonsales MD No    Priority:  Medium      Overview Addendum 2024  5:05 PM by Erika Mejía MD     Inpatient admission -  SSUS showed evidence of placental abruption  - Received BMZ  -    - Vaginal bleeding resolved as of           Supervision of high risk elderly multigravida in second trimester 2023 by JOSE F Curtis No    Priority:  Medium      Overview Addendum 3/12/2024 11:36 AM by Lisa Henao MD     Declines COVID, s/p Flu and Tdap  PPBC: Mirena interval         History of pre-eclampsia 10/10/2023 by RAYNE Gaston, APRN-CNP No    Priority:  Medium      Overview Addendum 2023  9:09 PM by RAYNE Magana     - PEC in first pregnancy  - baseline HELLP labs WNL, baseline P:C = 0.08  - Continue bASA 162mg every day          Constipation during pregnancy in first trimester 10/10/2023 by RAYNE Gaston APRN-CNP No    Priority:  Medium      Antepartum multigravida of advanced maternal age 10/10/2023 by RAYNE Gaston, SUN-CNP No    Priority:  Medium      Overview Addendum 2023 10:20 PM by Kay E Petiya, APRN-CNP     -s/p genetics  -cfDNA RR           History of  delivery, currently pregnant 10/10/2023 by  Alexa Gutierrez V, SUN-JAKY, APRN-CNP No    Priority:  Medium      Overview Addendum 2024  1:30 PM by Erika Pearl MD     30 and 36w PTB  Counseled at initial [facility] visit re: recurrence risk and symptom precautions   S/p cervical lengths from 16wks to 22 wks. wnl            Obesity complicating pregnancy, childbirth, or puerperium, antepartum 10/10/2023 by SUN Gaston-JAKY, APRN-CNP No    Priority:  Medium      Overview Addendum 2024 12:54 PM by Jannie Masters MD     BMI=31 at NOB  16 wk visit, BMI 35         30 weeks gestation of pregnancy 2024 by Jannie Masters MD 3/12/2024 by Lisa Henao MD    Priority:  Medium      Overview Addendum 3/12/2024 11:44 AM by Lisa Henao MD                Pregnancy headache, antepartum 10/10/2023 by SUN Gaston-JAKY, APRN-CNP 2024 by Citlali Richardson MD    Priority:  Medium                  Obstetrical History   OB History          5    Para   2    Term   0       2    AB   2    Living   2         SAB   2    IAB   0    Ectopic   0    Multiple   0    Live Births   2                 Past Medical History  Past Medical History:   Diagnosis Date    History of placenta abruption 2023    Pregnancy headache, antepartum 10/10/2023        Past Surgical History   Past Surgical History:   Procedure Laterality Date    MOUTH SURGERY  2017    Oral Surgery Tooth Extraction       Social History  Social History     Socioeconomic History    Marital status: Single     Spouse name: Mckay Wiggins    Number of children: 2    Years of education: high school graduate    Highest education level: High school graduate   Occupational History    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   Tobacco Use    Smoking status: Never    Smokeless tobacco: Never   Vaping Use    Vaping Use: Never used   Substance and Sexual Activity    Alcohol use: Never    Drug use: Never    Sexual activity: Yes   Other Topics  Concern    Not on file   Social History Narrative    Not on file     Social Determinants of Health     Financial Resource Strain: Medium Risk (3/25/2024)    Overall Financial Resource Strain (CARDIA)     Difficulty of Paying Living Expenses: Somewhat hard   Food Insecurity: No Food Insecurity (10/9/2023)    Hunger Vital Sign     Worried About Running Out of Food in the Last Year: Never true     Ran Out of Food in the Last Year: Never true   Transportation Needs: Unmet Transportation Needs (3/25/2024)    PRAPARE - Transportation     Lack of Transportation (Medical): Yes     Lack of Transportation (Non-Medical): Yes   Physical Activity: Not on file   Stress: Not on file   Social Connections: Not on file   Intimate Partner Violence: Not At Risk (10/9/2023)    Humiliation, Afraid, Rape, and Kick questionnaire     Fear of Current or Ex-Partner: No     Emotionally Abused: No     Physically Abused: No     Sexually Abused: No       Allergies  No Known Allergies    Medications  Medications Prior to Admission   Medication Sig Dispense Refill Last Dose    alcohol swabs pads, medicated Use 1, up to 5 times a day 200 each 3     aspirin 81 mg EC tablet Take 2 tablets (162 mg) by mouth once daily. 60 tablet 11 3/24/2024    blood sugar diagnostic (Accu-Chek Guide test strips) strip Use 1 strip to test sugar fasting and 1 hour after each meal. 4-6 times/day during pregnancy 150 each 3     blood-glucose meter (Accu-Chek Guide Glucose Meter) misc Use for testing blood sugar in pregnancy 1 each 0     docusate sodium (Colace) 100 mg capsule Take 1 capsule (100 mg) by mouth 2 times a day as needed for constipation. (Patient not taking: Reported on 3/25/2024) 30 capsule 4 More than a month    insulin glargine (Lantus Solostar U-100 Insulin) 100 unit/mL (3 mL) pen Inject 8 units in the morning before breakfast and at bedtime. 5 each 3 3/24/2024    insulin lispro (HumaLOG KwikPen Insulin) 100 unit/mL injection Inject 4 units before each  "meal 3 times daily. 5 each 3 3/24/2024    lancets misc Use 1 lancet 4-6 times per day during pregnancy 200 each 3     metoclopramide (Reglan) 10 mg tablet Take 1 tablet (10 mg) by mouth every 6 hours. (Patient not taking: Reported on 3/25/2024) 30 tablet 1 More than a month    pen needle, diabetic (Pen Needle) 32 gauge x 32\" needle Use 1 per injection, up to 5 times a day 200 each 3     PNV no.95/ferrous fum/folic ac (PRENATAL MULTIVITAMINS ORAL) Take by mouth.   3/24/2024       OBJECTIVE:   /73   Pulse 88   Temp 37.1 °C (98.8 °F) (Temporal)   Resp 18   Ht 1.727 m (5' 8\")   Wt 112 kg (247 lb 9.2 oz)   LMP  (LMP Unknown)   SpO2 97%   BMI 37.64 kg/m²    Temp  Min: 36.2 °C (97.2 °F)  Max: 37.1 °C (98.8 °F)  Pulse  Min: 73  Max: 110  BP  Min: 110/71  Max: 127/76    Physical exam:  General:  AAOx3, No acute distress  Cardiovascular: Warm and well perfused  Respiratory: Normal respiratory effort   Abdominal:  Soft, gravid, mild TTP on L side without rebound or guarding    NST: baseline 135, moderate variability, accels present, intermittent late vs spontaneous decels  [place]: occasional ctx     Labs:   Lab Results   Component Value Date    WBC 8.3 2024    HGB 10.8 (L) 2024    HCT 32.3 (L) 2024     2024     Lab Results   Component Value Date    APTT 25 (L) 2024    PROTIME 10.4 2024    INR 0.9 2024     Lab Results   Component Value Date    FIBRINOGEN 451 (H) 2024       ASSESSMENT AND PLAN:     39 y.o.  at 34w5d by 13w US admitted for spotting and NRFHT in setting of chronic abruption    Chronic abruption  - Admitted -, US showed 1.7 x 0.5 cm subchorionic hematoma at that time  - On exam overnight no blood in vagina but continues to have spotting  - Bedside US overnight showed MVP 7.8cm, KRYSTLE 18, unable to visualize subchorionic hematoma or thickened placenta  - CBC Hgb 10.8 from 11.3 prior, plts stable, coags and fibrinogen wnl  - Received BMZ " -, rescue BMZ not indicated as late   - Given recurrent bleeding, late , and intermittent late decels on NST indicating potential fetal compromise, recommend delivery    DMA2  - Last insulin regimen: lantus , lispro   - Continue insulin regimen during labor if eating with fasting and 1hr PP BG w SSI, for q4h BG with SSI while NPO    Fetal status  - Intermittent late decels on NST - cat 2, for delivery as above  - Last growth US 3/19 2612 80% AC 92%   - Received BMZ -  - NICU consulted    Routine:  - GBS pos , needs PCN  - TDAP 24  - Flu 23  - BCM: pp vs interval IUD    Dispo: for delivery    Pt seen and discussed with M Attending, Dr. Alejandro.     Raquel Deras MD  PGY-3, Obstetrics and Gynecology  [facility] Pager: [phone]

## 2024-03-25 NOTE — PROGRESS NOTES
Intrapartum Progress Note    Assessment/Plan   Fiorella Leon is a 39 y.o.  at 34w5d. ANA CRISTINA: 2024, by Ultrasound.     Latent labor   - Induction in s/o c/f chronic abruption. Last hgb 10.8, T&C x1u PRBC   - Pit started at 1115, paused for one hour, restarted at 1630  - AROM'd for clear at 1500  - CEFM, Cat 2 currently in setting of variable decels though overall reassuring with low suspicion for acid-base disturbance of the fetus given moderate variability and accelerations, continue to monitor; improving  -no bleeding currently   -GBS pos, pcn     GDMA2  - S/p Lantus 8u AM, Lispro 6u with breakfast   - Q4 hour POCT BG checks, SSI  ordered    Hx of PEC  -normotensive    Seen and discussed with Dr. Nadia Goodman MD PGY-1      Principal Problem:    Placental abruption in third trimester  Active Problems:    Antepartum multigravida of advanced maternal age    History of  delivery, currently pregnant    Insulin controlled gestational diabetes mellitus (GDM) in third trimester    34 weeks gestation of pregnancy    Gastroesophageal reflux disease    Pregnancy Problems (from 10/09/23 to present)       Problem Noted Resolved    Placental abruption in third trimester 3/25/2024 by Ana Anderson MD No    Priority:  Medium      34 weeks gestation of pregnancy 3/12/2024 by Lisa Henao MD No    Priority:  Medium      Overview Signed 3/12/2024 11:45 AM by Lisa Henao MD     Dating: by 13.5 wk US   [x] Initial BMI: 34  [x] Prenatal Labs: reviewed  [x] Aneuploidy Screening: neg cfDNA, s/p genetics for AMA  [x] Baby ASA: rx   [x] Anatomy US: reviewed  [x] 1hr GCT, CBC, and sylphis at 24-28wks: elevated 1hr.  [x] Tdap (27-36wks):   [x] Flu vaccine: 23  [x] COVID vaccine: Declines  [x] Rhogam (if Rh neg): A POS, not indicated  [] GBS at 36 wks:  [] Breastfeeding - needs pump! Now has insurance  [x] PPBC:  ppMirena   [x] Mode of delivery:  anticipate vaginal          Insulin controlled  gestational diabetes mellitus (GDM) in third trimester 2024 by Lan Alejandro MD No    Priority:  Medium      Overview Addendum 3/12/2024 11:39 AM by Lisa Henao MD     Diagnosed at 16w with A1c of 7.0  [X] ASA  [X] MFM consult - inpatient   [ ] Serial growth  [x] Nutrition  [x] Boot Camp    Last ultrasound:    Fetal surveillance:  [ ] Twice weekly at 32 weeks    Current Regimen:    Delivery Plan:  Recommended gestational age: pending 36 week follow up visit  Intrapartum:  GDM protocol  Postpartum: No medication    Lantus 8/8, Lispro 4/4 TID started during inpatient admission for vaginal bleeding  3/12: Will change to Lantus 8/8 and Lispro 6/4/4. Lispro 6 only when having higher carb breakfast.         History of pre-eclampsia 10/10/2023 by RAYNE Gaston, SUN-CNP No    Priority:  Medium      Overview Addendum 2023  9:09 PM by RAYNE Magana     - PEC in first pregnancy  - baseline HELLP labs WNL, baseline P:C = 0.08  - Continue bASA 162mg every day          Constipation during pregnancy in first trimester 10/10/2023 by RAYNE Gaston, SUN-CNP No    Priority:  Medium      Antepartum multigravida of advanced maternal age 10/10/2023 by RAYNE Gaston, APRHENRRY-CNP No    Priority:  Medium      Overview Addendum 2023 10:20 PM by JOSE F Curtis     -s/p genetics  -cfDNA RR           History of  delivery, currently pregnant 10/10/2023 by RAYNE Gaston, APRHENRRY-CNP No    Priority:  Medium      Overview Addendum 2024  1:30 PM by Erika Pearl MD     30 and 36w PTB  Counseled at initial M visit re: recurrence risk and symptom precautions   S/p cervical lengths from 16wks to 22 wks. wnl            Obesity complicating pregnancy, childbirth, or puerperium, antepartum 10/10/2023 by RAYNE Gaston, SUN-CNP No    Priority:  Medium      Overview Addendum 2024 12:54 PM by Jannie Masters MD      BMI=31 at NOB  16 wk visit, BMI 35         Placental abruption, antepartum, unspecified trimester 1/31/2024 by Nima Gonsales MD 3/25/2024 by Lan Alejandro MD    Priority:  Medium      Overview Addendum 2/26/2024  5:05 PM by Erika Mejía MD     Inpatient admission - 2/1 SSUS showed evidence of placental abruption  - Received BMZ 1/31 - 2/1   - Vaginal bleeding resolved as of 2/4          Supervision of high risk elderly multigravida in second trimester 11/13/2023 by Kay Sabillon, APRN-CNP 3/25/2024 by Lan Alejandro MD    Priority:  Medium      Overview Addendum 3/12/2024 11:36 AM by Lisa Henao MD     Declines COVID, s/p Flu and Tdap  PPBC: Mirena interval         30 weeks gestation of pregnancy 1/26/2024 by Jannie Masters MD 3/12/2024 by Lisa Henao MD    Overview Addendum 3/12/2024 11:44 AM by Lisa Henao MD                Pregnancy headache, antepartum 10/10/2023 by Alexa Gutierrez V APRN-CNM, APRN-CNP 2/13/2024 by Citlali Richardson MD            Subjective   Pt feeling well, ctx tolerable at this time    Objective   Last Vitals:  Temp Pulse Resp BP MAP Pulse Ox   37 °C (98.6 °F) (!) 124 17 123/85   100 %     Vitals Min/Max Last 24 Hours:  Temp  Min: 36.2 °C (97.2 °F)  Max: 37.1 °C (98.8 °F)  Pulse  Min: 73  Max: 124  Resp  Min: 16  Max: 19  BP  Min: 109/55  Max: 133/86    Intake/Output:    Intake/Output Summary (Last 24 hours) at 3/25/2024 1856  Last data filed at 3/25/2024 1119  Gross per 24 hour   Intake 777.08 ml   Output --   Net 777.08 ml       Physical Examination:  Cervical Exam  Dilation: 4  Effacement (%): 60  Fetal Station: -2  Method: Sterile speculum  OB Examiner: Ilda  Fetal Assessment  Movement: Present  Mode: External US  Baseline Fetal Heart Rate (bpm): 145 bpm  Baseline Classification: Normal  Variability: Moderate (Between 6 and 25 BPM)  Pattern: Variable decelerations  Pattern Observations: pt up to bathroom to void  FHR Category: Category I      Contraction  Frequency: 2-5          Lab Review:  Labs in chart were reviewed.

## 2024-03-26 ENCOUNTER — APPOINTMENT (OUTPATIENT)
Dept: MATERNAL FETAL MEDICINE | Facility: CLINIC | Age: 39
End: 2024-03-26
Payer: MEDICAID

## 2024-03-26 ENCOUNTER — HOSPITAL ENCOUNTER (OUTPATIENT)
Dept: RADIOLOGY | Facility: CLINIC | Age: 39
Discharge: HOME | End: 2024-03-26
Payer: MEDICAID

## 2024-03-26 ENCOUNTER — APPOINTMENT (OUTPATIENT)
Dept: OBSTETRICS AND GYNECOLOGY | Facility: CLINIC | Age: 39
End: 2024-03-26
Payer: MEDICAID

## 2024-03-26 PROBLEM — O99.611 CONSTIPATION DURING PREGNANCY IN FIRST TRIMESTER (HHS-HCC): Status: RESOLVED | Noted: 2023-10-10 | Resolved: 2024-03-26

## 2024-03-26 PROBLEM — O99.210 OBESITY COMPLICATING PREGNANCY, CHILDBIRTH, OR PUERPERIUM, ANTEPARTUM (HHS-HCC): Status: RESOLVED | Noted: 2023-10-10 | Resolved: 2024-03-26

## 2024-03-26 PROBLEM — O24.414 INSULIN CONTROLLED GESTATIONAL DIABETES MELLITUS (GDM) IN THIRD TRIMESTER (HHS-HCC): Status: RESOLVED | Noted: 2024-02-04 | Resolved: 2024-03-26

## 2024-03-26 PROBLEM — Z3A.34 34 WEEKS GESTATION OF PREGNANCY (HHS-HCC): Status: RESOLVED | Noted: 2024-03-12 | Resolved: 2024-03-26

## 2024-03-26 PROBLEM — O09.529 ANTEPARTUM MULTIGRAVIDA OF ADVANCED MATERNAL AGE (HHS-HCC): Status: RESOLVED | Noted: 2023-10-10 | Resolved: 2024-03-26

## 2024-03-26 PROBLEM — K59.00 CONSTIPATION DURING PREGNANCY IN FIRST TRIMESTER (HHS-HCC): Status: RESOLVED | Noted: 2023-10-10 | Resolved: 2024-03-26

## 2024-03-26 PROBLEM — O09.899 HISTORY OF PRETERM DELIVERY, CURRENTLY PREGNANT (HHS-HCC): Status: RESOLVED | Noted: 2023-10-10 | Resolved: 2024-03-26

## 2024-03-26 PROBLEM — O45.93 PLACENTAL ABRUPTION IN THIRD TRIMESTER (HHS-HCC): Status: RESOLVED | Noted: 2024-03-25 | Resolved: 2024-03-26

## 2024-03-26 LAB
ERYTHROCYTE [DISTWIDTH] IN BLOOD BY AUTOMATED COUNT: 12.9 % (ref 11.5–14.5)
GLUCOSE BLD MANUAL STRIP-MCNC: 111 MG/DL (ref 74–99)
HCT VFR BLD AUTO: 34.3 % (ref 36–46)
HGB BLD-MCNC: 11.8 G/DL (ref 12–16)
MCH RBC QN AUTO: 32.6 PG (ref 26–34)
MCHC RBC AUTO-ENTMCNC: 34.4 G/DL (ref 32–36)
MCV RBC AUTO: 95 FL (ref 80–100)
NRBC BLD-RTO: 0 /100 WBCS (ref 0–0)
PLATELET # BLD AUTO: 269 X10*3/UL (ref 150–450)
RBC # BLD AUTO: 3.62 X10*6/UL (ref 4–5.2)
WBC # BLD AUTO: 14.4 X10*3/UL (ref 4.4–11.3)

## 2024-03-26 PROCEDURE — 2500000001 HC RX 250 WO HCPCS SELF ADMINISTERED DRUGS (ALT 637 FOR MEDICARE OP)

## 2024-03-26 PROCEDURE — 7100000016 HC LABOR RECOVERY PER HOUR

## 2024-03-26 PROCEDURE — 59050 FETAL MONITOR W/REPORT: CPT

## 2024-03-26 PROCEDURE — 7210000002 HC LABOR PER HOUR

## 2024-03-26 PROCEDURE — 36415 COLL VENOUS BLD VENIPUNCTURE: CPT

## 2024-03-26 PROCEDURE — 2500000004 HC RX 250 GENERAL PHARMACY W/ HCPCS (ALT 636 FOR OP/ED)

## 2024-03-26 PROCEDURE — 1210000001 HC SEMI-PRIVATE ROOM DAILY

## 2024-03-26 PROCEDURE — 51702 INSERT TEMP BLADDER CATH: CPT

## 2024-03-26 PROCEDURE — 82947 ASSAY GLUCOSE BLOOD QUANT: CPT

## 2024-03-26 PROCEDURE — 85027 COMPLETE CBC AUTOMATED: CPT

## 2024-03-26 RX ORDER — IBUPROFEN 600 MG/1
600 TABLET ORAL EVERY 6 HOURS
Status: DISCONTINUED | OUTPATIENT
Start: 2024-03-26 | End: 2024-03-29 | Stop reason: HOSPADM

## 2024-03-26 RX ORDER — OXYTOCIN/0.9 % SODIUM CHLORIDE 30/500 ML
60 PLASTIC BAG, INJECTION (ML) INTRAVENOUS ONCE AS NEEDED
Status: DISCONTINUED | OUTPATIENT
Start: 2024-03-26 | End: 2024-03-29 | Stop reason: HOSPADM

## 2024-03-26 RX ORDER — ACETAMINOPHEN 325 MG/1
975 TABLET ORAL EVERY 6 HOURS
Status: DISCONTINUED | OUTPATIENT
Start: 2024-03-26 | End: 2024-03-29 | Stop reason: HOSPADM

## 2024-03-26 RX ORDER — DIPHENHYDRAMINE HYDROCHLORIDE 50 MG/ML
25 INJECTION INTRAMUSCULAR; INTRAVENOUS EVERY 4 HOURS PRN
Status: DISCONTINUED | OUTPATIENT
Start: 2024-03-26 | End: 2024-03-29 | Stop reason: HOSPADM

## 2024-03-26 RX ORDER — BUTORPHANOL TARTRATE 2 MG/ML
1 INJECTION INTRAMUSCULAR; INTRAVENOUS
Status: DISCONTINUED | OUTPATIENT
Start: 2024-03-26 | End: 2024-03-26

## 2024-03-26 RX ORDER — LOPERAMIDE HYDROCHLORIDE 2 MG/1
4 CAPSULE ORAL EVERY 2 HOUR PRN
Status: DISCONTINUED | OUTPATIENT
Start: 2024-03-26 | End: 2024-03-29 | Stop reason: HOSPADM

## 2024-03-26 RX ORDER — OXYTOCIN 10 [USP'U]/ML
10 INJECTION, SOLUTION INTRAMUSCULAR; INTRAVENOUS ONCE AS NEEDED
Status: DISCONTINUED | OUTPATIENT
Start: 2024-03-26 | End: 2024-03-29 | Stop reason: HOSPADM

## 2024-03-26 RX ORDER — BISACODYL 10 MG/1
10 SUPPOSITORY RECTAL DAILY PRN
Status: DISCONTINUED | OUTPATIENT
Start: 2024-03-26 | End: 2024-03-29 | Stop reason: HOSPADM

## 2024-03-26 RX ORDER — NIFEDIPINE 10 MG/1
10 CAPSULE ORAL ONCE AS NEEDED
Status: DISCONTINUED | OUTPATIENT
Start: 2024-03-26 | End: 2024-03-29 | Stop reason: HOSPADM

## 2024-03-26 RX ORDER — HYDROMORPHONE HYDROCHLORIDE 1 MG/ML
0.2 INJECTION, SOLUTION INTRAMUSCULAR; INTRAVENOUS; SUBCUTANEOUS EVERY 5 MIN PRN
Status: DISCONTINUED | OUTPATIENT
Start: 2024-03-26 | End: 2024-03-29 | Stop reason: HOSPADM

## 2024-03-26 RX ORDER — KETOROLAC TROMETHAMINE 30 MG/ML
30 INJECTION, SOLUTION INTRAMUSCULAR; INTRAVENOUS EVERY 6 HOURS
Status: COMPLETED | OUTPATIENT
Start: 2024-03-26 | End: 2024-03-26

## 2024-03-26 RX ORDER — POLYETHYLENE GLYCOL 3350 17 G/17G
17 POWDER, FOR SOLUTION ORAL 2 TIMES DAILY PRN
Status: DISCONTINUED | OUTPATIENT
Start: 2024-03-26 | End: 2024-03-29 | Stop reason: HOSPADM

## 2024-03-26 RX ORDER — DIPHENHYDRAMINE HCL 25 MG
25 CAPSULE ORAL EVERY 4 HOURS PRN
Status: DISCONTINUED | OUTPATIENT
Start: 2024-03-26 | End: 2024-03-29 | Stop reason: HOSPADM

## 2024-03-26 RX ORDER — OXYCODONE HYDROCHLORIDE 5 MG/1
10 TABLET ORAL EVERY 4 HOURS PRN
Status: DISCONTINUED | OUTPATIENT
Start: 2024-03-26 | End: 2024-03-29 | Stop reason: HOSPADM

## 2024-03-26 RX ORDER — MISOPROSTOL 200 UG/1
800 TABLET ORAL ONCE AS NEEDED
Status: DISCONTINUED | OUTPATIENT
Start: 2024-03-26 | End: 2024-03-29 | Stop reason: HOSPADM

## 2024-03-26 RX ORDER — HYDRALAZINE HYDROCHLORIDE 20 MG/ML
5 INJECTION INTRAMUSCULAR; INTRAVENOUS ONCE AS NEEDED
Status: DISCONTINUED | OUTPATIENT
Start: 2024-03-26 | End: 2024-03-29 | Stop reason: HOSPADM

## 2024-03-26 RX ORDER — CARBOPROST TROMETHAMINE 250 UG/ML
250 INJECTION, SOLUTION INTRAMUSCULAR ONCE AS NEEDED
Status: DISCONTINUED | OUTPATIENT
Start: 2024-03-26 | End: 2024-03-29 | Stop reason: HOSPADM

## 2024-03-26 RX ORDER — NALOXONE HYDROCHLORIDE 0.4 MG/ML
0.1 INJECTION, SOLUTION INTRAMUSCULAR; INTRAVENOUS; SUBCUTANEOUS EVERY 5 MIN PRN
Status: DISCONTINUED | OUTPATIENT
Start: 2024-03-26 | End: 2024-03-29 | Stop reason: HOSPADM

## 2024-03-26 RX ORDER — ONDANSETRON HYDROCHLORIDE 2 MG/ML
4 INJECTION, SOLUTION INTRAVENOUS EVERY 6 HOURS PRN
Status: DISCONTINUED | OUTPATIENT
Start: 2024-03-26 | End: 2024-03-29 | Stop reason: HOSPADM

## 2024-03-26 RX ORDER — OXYCODONE HYDROCHLORIDE 5 MG/1
5 TABLET ORAL EVERY 4 HOURS PRN
Status: DISCONTINUED | OUTPATIENT
Start: 2024-03-26 | End: 2024-03-29 | Stop reason: HOSPADM

## 2024-03-26 RX ORDER — METHYLERGONOVINE MALEATE 0.2 MG/ML
0.2 INJECTION INTRAVENOUS ONCE AS NEEDED
Status: DISCONTINUED | OUTPATIENT
Start: 2024-03-26 | End: 2024-03-29 | Stop reason: HOSPADM

## 2024-03-26 RX ORDER — SIMETHICONE 80 MG
80 TABLET,CHEWABLE ORAL 4 TIMES DAILY PRN
Status: DISCONTINUED | OUTPATIENT
Start: 2024-03-26 | End: 2024-03-29 | Stop reason: HOSPADM

## 2024-03-26 RX ORDER — ONDANSETRON 4 MG/1
4 TABLET, FILM COATED ORAL EVERY 6 HOURS PRN
Status: DISCONTINUED | OUTPATIENT
Start: 2024-03-26 | End: 2024-03-29 | Stop reason: HOSPADM

## 2024-03-26 RX ORDER — ENOXAPARIN SODIUM 100 MG/ML
40 INJECTION SUBCUTANEOUS EVERY 24 HOURS
Status: DISCONTINUED | OUTPATIENT
Start: 2024-03-26 | End: 2024-03-29 | Stop reason: HOSPADM

## 2024-03-26 RX ORDER — LIDOCAINE 560 MG/1
1 PATCH PERCUTANEOUS; TOPICAL; TRANSDERMAL
Status: DISCONTINUED | OUTPATIENT
Start: 2024-03-26 | End: 2024-03-29 | Stop reason: HOSPADM

## 2024-03-26 RX ORDER — LABETALOL HYDROCHLORIDE 5 MG/ML
20 INJECTION, SOLUTION INTRAVENOUS ONCE AS NEEDED
Status: DISCONTINUED | OUTPATIENT
Start: 2024-03-26 | End: 2024-03-29 | Stop reason: HOSPADM

## 2024-03-26 RX ORDER — TRANEXAMIC ACID 100 MG/ML
1000 INJECTION, SOLUTION INTRAVENOUS ONCE AS NEEDED
Status: DISCONTINUED | OUTPATIENT
Start: 2024-03-26 | End: 2024-03-29 | Stop reason: HOSPADM

## 2024-03-26 RX ORDER — ADHESIVE BANDAGE
10 BANDAGE TOPICAL
Status: DISCONTINUED | OUTPATIENT
Start: 2024-03-26 | End: 2024-03-29 | Stop reason: HOSPADM

## 2024-03-26 RX ADMIN — ACETAMINOPHEN 975 MG: 325 TABLET ORAL at 16:44

## 2024-03-26 RX ADMIN — DIPHENHYDRAMINE HYDROCHLORIDE 25 MG: 25 CAPSULE ORAL at 16:44

## 2024-03-26 RX ADMIN — ACETAMINOPHEN 975 MG: 325 TABLET ORAL at 04:33

## 2024-03-26 RX ADMIN — KETOROLAC TROMETHAMINE 30 MG: 30 INJECTION, SOLUTION INTRAMUSCULAR; INTRAVENOUS at 10:26

## 2024-03-26 RX ADMIN — KETOROLAC TROMETHAMINE 30 MG: 30 INJECTION, SOLUTION INTRAMUSCULAR; INTRAVENOUS at 04:33

## 2024-03-26 RX ADMIN — DIPHENHYDRAMINE HYDROCHLORIDE 25 MG: 25 CAPSULE ORAL at 22:21

## 2024-03-26 RX ADMIN — ENOXAPARIN SODIUM 40 MG: 100 INJECTION SUBCUTANEOUS at 10:26

## 2024-03-26 RX ADMIN — ACETAMINOPHEN 975 MG: 325 TABLET ORAL at 10:26

## 2024-03-26 RX ADMIN — DIPHENHYDRAMINE HYDROCHLORIDE 25 MG: 25 CAPSULE ORAL at 10:26

## 2024-03-26 RX ADMIN — ACETAMINOPHEN 975 MG: 325 TABLET ORAL at 22:21

## 2024-03-26 RX ADMIN — ONDANSETRON 4 MG: 2 INJECTION INTRAMUSCULAR; INTRAVENOUS at 03:15

## 2024-03-26 RX ADMIN — KETOROLAC TROMETHAMINE 30 MG: 30 INJECTION, SOLUTION INTRAMUSCULAR; INTRAVENOUS at 16:45

## 2024-03-26 RX ADMIN — IBUPROFEN 600 MG: 600 TABLET ORAL at 22:21

## 2024-03-26 RX ADMIN — DIPHENHYDRAMINE HYDROCHLORIDE 25 MG: 50 INJECTION INTRAMUSCULAR; INTRAVENOUS at 03:22

## 2024-03-26 ASSESSMENT — PAIN SCALES - GENERAL
PAINLEVEL_OUTOF10: 0 - NO PAIN
PAIN_LEVEL: 0

## 2024-03-26 NOTE — PROGRESS NOTES
Postpartum Progress Note    Assessment/Plan     Fiorella Leon is a 39 y.o., , who initially presented for vaginal bleeding ISO known placental abruption. For LTCS ISO NRFHT.   She had a , Low Transverse  delivery on 3/25/2024  at 34w5d and is now POD1.    #Post-op , Low Transverse   - continue routine postoperative care  - pain well controlled on ERAS protocol  - Hg  10.8  -->  -> POD1 11.8  - DVT Score: 5 SCDs and enoxaparin prophylactic dose daily while inpatient  - The patient's blood type is A POS. Rhogam is not indicated.    #Contraception  Interval IUD declines bridge    #Maternal Well-Being  - emotional support provided  - Hx PEC in previous pregnancy, was on 162mg bASA intrapartum  - NICU SW consult  -  feeding: breastfeeding/pumping encouraged; lactation consult prn     #Dispo  - anticipate d/c on POD #3 if meeting all post-op and postpartum milestones  - for f/u 2wks with Primary OB provider     Active Problems:    History of pre-eclampsia    Gastroesophageal reflux disease     delivery delivered      Subjective   Her pain is well controlled with current medications  She is not passing flatus  She is ambulating independently  She is tolerating a Adult diet Regular  She is not voiding spontaneously  She reports no breast or nursing problems  She denies emotional concerns today     Pt seen returning from NICU. Pleasant. Denies complaints at this time.     Objective   Last Vitals:  Temp Pulse Resp BP MAP Pulse Ox   36.3 °C (97.3 °F) 66 18 105/68   96 %     Vitals Min/Max Last 24 Hours:  Temp  Min: 36.3 °C (97.3 °F)  Max: 37.2 °C (99 °F)  Pulse  Min: 66  Max: 141  Resp  Min: 16  Max: 19  BP  Min: 84/51  Max: 133/79    Intake/Output:     Intake/Output Summary (Last 24 hours) at 3/26/2024 1028  Last data filed at 3/26/2024 0629  Gross per 24 hour   Intake 2414.95 ml   Output 830 ml   Net 1584.95 ml       Physical Exam:  General: well appearing, well nourished,  postpartum  Obstetric: fundus firm below umbilicus, lochia light  Skin: Warm, dry; no rashes/lesions/erythema; CS dressing CDI  Breast: No masses, nipple discharge  Neuro: A/Ox3, no gross motor deficit   GI: moderate distension, non-tender, soft, +BS  Respiratory: Even and unlabored on RA, LSCTA BL  Cardiovascular: No BLE edema; No erythema, warmth  Psych: appropriate mood and affect    Lab Data:  Lab Results   Component Value Date    WBC 14.4 (H) 03/26/2024    HGB 11.8 (L) 03/26/2024    HCT 34.3 (L) 03/26/2024     03/26/2024

## 2024-03-26 NOTE — L&D DELIVERY NOTE
OB Delivery Note  3/26/2024  Fiorella Leon  39 y.o.   , Low Transverse        Gestational Age: 34w5d  /Para:   Quantitative Blood Loss: Admission to Discharge: 450 mL (3/25/2024 12:22 AM - 3/26/2024  2:39 AM)    Date: 3/25/2024  OR Location: [facility] [place] 2 OB    Name: Fiorella Leon, : 1985, Age: 39 y.o., MRN: [id number], Sex: female    Diagnosis  Preop:     1.IUP 34.5wga  2. Placental abruption  3. GDMA2  4.  Fetal intolerance of labor  5. NRFHT remote from delivery Postop:     same     Procedures    * OBGYN Delivery  Section    Surgeons      * Kiel Zuniga - Primary    Resident/Fellow/Other Assistant:  Surgeon(s) and Role: Josie Goodman MD PGY-1      Procedure Summary  Anesthesia: * No anesthesia type entered *  ASA: III  Anesthesia Staff: Anesthesiologist: Brad Caal MD PhD  CRNA: SUN Troncoso-CRNA  SRNA: Kay Rider  Estimated Blood Loss: 450mL  Intra-op Medications: * Intraprocedure medication information is unavailable because the case start and end events have not been set *           Anesthesia Record               Intraprocedure I/O Totals          Intake    .00 mL    Oxytocin Drip 300.00 mL    The total shown is the total volume documented since Anesthesia Start was filed.    fentaNYL (PF)-BUPivacaine - Epi 1.25 mcg/mL- 0.044 % 11.20 mL    azithromycin 500 mg/250 mL 250.00 mL    Total Intake 1411.2 mL       Output    Urine 110 mL    Total Output 110 mL       Net    Net Volume 1301.2 mL          Specimen: placenta      Procedure Details:    Indications: chronic placental abruption, fetal intolerance of labor, NRFHT remote from delivery    Findings: Normal appearing gravid uterus, fallopian tubes, and ovaries. Amniotic fluid clear, male infant in cephalic presentation    Procedure: Patient was taken to the OR where epidural anesthesia was redosed.  She was then placed in the dorsal supine position with a left lateral tilt. A carrasco catheter was  placed, SCDs were applied, and a vaginal prep was performed. A pre-procedure time out was performed.  The patient was given prophylactic dose of IV antibiotics. She was then prepped and draped in the usual sterile fashion. A Pfannenstiel skin incision was made through the skin with the scalpel and then carried through the subcutaneous fat to the underlying fascial layer with sharp dissection/electrosurgery. The fascia was incised on either side of the midline with the scalpel, and fascia was then dissected off the rectus muscle bilaterally bluntly. The muscles were divided in the midline, the peritoneum was identified and then entered bluntly, and incision extended bilaterally with good visualization of bladder below. Bladder blade was inserted. A low transverse uterine incision was made with the scalpel, the uterine cavity was entered, and the hysterotomy was extended bilaterally with cephalocaudal stretching. Thick uterine wall noted, and incision extended bilaterally with bandage scissors. The infant was delivered atraumatically, the cord was clamped and cut, and infant was handed off to the awaiting nursing staff. A cord segment and blood sample were collected. The placenta was then expressed. The uterus was exteriorized and cleared of all clot and debris. The uterine incision was repaired using a running stitch of 0-Vicryl. A second layer of the same suture was placed in an imbricating fashion. An additional figure of 8 stitch place at an area of bleeding along the hysterotomy. Good hemostasis was noted. The uterus was then placed back inside the pelvis, the gutters were cleared of all clots and debris. The hysterotomy was again evaluated and found to be hemostatic, and the underside of the fascia and bladder and the rectus muscles were also found to be hemostatic. The fascia was closed using a running stitch of 0-PDS. An additional figure of 8 stitch of 0 vicryl placed at an area of bleeding. The subcutaneous  layer was irrigated, small bleeding vessels were cauterized, and the subcutaneous layer was reapproximated using a running stitch of 2-0 Monocryl. The skin was closed with 4-0 Monocryl.  All counts were correct, the patient tolerated the procedure well. Dr. Zuniga was present for all key portions of the procedure. The infant was taken to the NICU by the pediatric team. The patient was taken back to the recovery room in stable condition.    Tara Leon [[id number]]      Labor Events    Sac identifier: Sac 1  Rupture date/time: 3/25/2024 1459  Rupture type: Spontaneous  Fluid color: Clear  Fluid odor: None  Labor type: Induced Onset of Labor  Labor allowed to proceed with plans for an attempted vaginal birth?: Yes  Induction: AROM, Oxytocin, Misoprostol  Induction indications: Other, Diabetes  Complications: Abruptio Placenta, Fetal Intolerance       Labor Event Times    Labor onset date/time: 3/25/2024 0118  Decision date/time (emergent ): 3/25/2024 2100       Labor Length    3rd stage: 0h 02m       Placenta    Placenta delivery date/time: 3/25/2024 2125  Placenta removal: Expressed  Placenta appearance: Intact  Placenta disposition: pathology       Cord    Complications: Nuchal  Nuchal intervention: reduced  Nuchal cord description: loose nuchal cord  Delayed cord clamping?: Yes  Cord blood disposition: Lab  Gases sent?: Yes  Stem cell collection (by provider): No       Lacerations    Episiotomy: None  Perineal laceration: None  Other lacerations?: No  Repair suture: None       Anesthesia    Method: Epidural       Operative Delivery    Forceps attempted?: No  Vacuum extractor attempted?: No       Shoulder Dystocia    Shoulder dystocia present?: No        Delivery    Time head delivered: 3/25/2024 21:23:00  Birth date/time: 3/25/2024 21:23:00  Delivery type: , Low Transverse   categorization: primary   priority: urgent  Indications for : Fetal Intolerance of  Labor, Persistent Category 2 Tracing Remote from Delivery  Incision type: low transverse  Complications: Abruptio Placenta, Fetal Intolerance       Resuscitation    Method: Continuous positive airway pressure (CPAP), Tactile stimulation, Supplemental oxygen, Suctioning       Apgars    Living status: Living  Apgar Component Scores:  1 min.:  5 min.:  10 min.:  15 min.:  20 min.:    Skin color:  1  1       Heart rate:  1  2       Reflex irritability:  1  2       Muscle tone:  1  1       Respiratory effort:  0  2       Total:  4  8       Apgars assigned by: [facility] NICU       Delivery Providers    Delivering clinician: Kiel Zuniga MD   Provider Role    Dioni Zavala, RN Delivery Nurse    Lydia Stephens, RN Nursery Nurse    Josie Goodman MD Resident                 Josie Goodman MD

## 2024-03-26 NOTE — SIGNIFICANT EVENT
At bedside  for 3  min decel. Epidural placed 2000, pit off during placement for difficulty tracing. FHT back to baseline with position changes    Cervical Exam  Dilation: 4  Effacement (%): 80  Fetal Station: -2  Method: Manual  OB Examiner: Rex ERICKSON  Fetal Assessment  Movement: Present  Mode: External US  Baseline Fetal Heart Rate (bpm): 150 bpm  Baseline Classification: Normal  Variability: Moderate (Between 6 and 25 BPM)  Pattern: Variable decelerations  Pattern Observations: pt up to bathroom to void  FHR Category: Category I      Contraction Frequency: 2-5      SVE unchanged. Previous discussion of potential of fetal intolerance of labor. Will attempt pitocin when appropriate    Discussed with Dr. Nadia Goodman MD PGY-1

## 2024-03-26 NOTE — ANESTHESIA POSTPROCEDURE EVALUATION
Patient: Fiorella Leon    Procedure Summary       Date: 24 Room / Location: [facility] 2 OB    Anesthesia Start:  Anesthesia Stop:     Procedure: OBGYN Delivery  Section Diagnosis: (Other (Add Comments))    Surgeons: Kiel Zuniga MD Responsible Provider: Brad Caal MD PhD    Anesthesia Type: epidural ASA Status: 2            Anesthesia Type: epidural    Vitals Value Taken Time   BP 91/61 243   Temp 36.5 241   Pulse 84 24   Resp 16 24   SpO2 97 % 24       Anesthesia Post Evaluation    Patient location during evaluation: bedside  Patient participation: complete - patient participated  Level of consciousness: awake and alert  Pain management: adequate  Airway patency: patent  Cardiovascular status: acceptable  Respiratory status: acceptable  Hydration status: acceptable  Postoperative Nausea and Vomiting: none        No notable events documented.

## 2024-03-26 NOTE — SIGNIFICANT EVENT
Patient meets criteria for home monitoring of blood pressure post discharge.  Reason: past medical history of preeclampsia,. Met with patient to assess for availability of home BP monitor.  Patient stated she owns home BP monitor. . Patient educated on importance of continuing to monitor BP at home, recording BP on home monitoring log and s/sx of when to call her provider.  Pt verbalized understanding the above information.

## 2024-03-26 NOTE — SIGNIFICANT EVENT
Decision for C/S     Pt with two prolonged decels while not on pitocin, remote from delivery (cervix still 4/80)  Recommend C/S and pt agreeable  Urgent due to fetal distress in setting of abruption @ 34w5d  Kiel Zuniga MD

## 2024-03-26 NOTE — ANESTHESIA POSTPROCEDURE EVALUATION
Patient: Fiorella Leon    Procedure Summary       Date: 24 Room / Location: [facility]    Anesthesia Start:  Anesthesia Stop:     Procedure: OBGYN Delivery  Section Diagnosis: (Other (Add Comments))    Surgeons: Kiel Zuniga MD Responsible Provider: Brad Caal MD PhD    Anesthesia Type: epidural ASA Status: 3            Anesthesia Type: epidural    Anesthesia Post Evaluation    Patient participation: complete - patient participated  Level of consciousness: awake and alert  Pain score: 0  Pain management: adequate  Airway patency: patent  Cardiovascular status: acceptable  Respiratory status: acceptable  Hydration status: acceptable  Postoperative Nausea and Vomiting: mild        Fiorella Leon is a 39 y.o., , who had a , Low Transverse  delivery on 3/25/2024  at 34w5d and is now POD1.    She had Neuraxial Anesthesia without immediate complications noted.       Pain well controlled    Vitals:    24 0433   BP: 107/75   Pulse: 82   Resp: 18   Temp: 36.5 °C (97.7 °F)   SpO2: 96%       Neuraxial site assessed. No visible redness or swelling or drainage. Patient able to ambulate and move all extremities without difficulty. Able to void. No complaints of nausea/vomiting. Tolerating PO intake well. No s/sx of PDPH.     Anesthesia will sign off     Nghia Yang MD

## 2024-03-26 NOTE — CARE PLAN
The patient's goals for the shift include tolerate food with out N/V. [place]yer with baby    The clinical goals for the shift include pain control    Over the shift, the patient was able to tolerate food and liquids without nausea or vomiting. Patient was able to pump and took milk to the NICU.  Pain well controlled with meds.     Problem: Postpartum  Goal: Experiences normal postpartum course  Outcome: Progressing  Goal: Appropriate maternal -  bonding  Outcome: Progressing  Goal: Establish and maintain infant feeding pattern for adequate nutrition  Outcome: Progressing  Goal: Incisions, wounds, or drain sites healing without S/S of infection  Outcome: Progressing  Goal: No s/sx infection  Outcome: Progressing  Goal: No s/sx of hemorrhage  Outcome: Progressing  Goal: Minimal s/sx of HDP and BP<160/110  Outcome: Progressing     Problem: Pain - Adult  Goal: Verbalizes/displays adequate comfort level or baseline comfort level  Outcome: Progressing

## 2024-03-27 PROCEDURE — 2500000001 HC RX 250 WO HCPCS SELF ADMINISTERED DRUGS (ALT 637 FOR MEDICARE OP)

## 2024-03-27 PROCEDURE — 2500000004 HC RX 250 GENERAL PHARMACY W/ HCPCS (ALT 636 FOR OP/ED)

## 2024-03-27 PROCEDURE — 1210000001 HC SEMI-PRIVATE ROOM DAILY

## 2024-03-27 RX ADMIN — IBUPROFEN 600 MG: 600 TABLET ORAL at 16:54

## 2024-03-27 RX ADMIN — DIPHENHYDRAMINE HYDROCHLORIDE 25 MG: 25 CAPSULE ORAL at 10:37

## 2024-03-27 RX ADMIN — DIPHENHYDRAMINE HYDROCHLORIDE 25 MG: 25 CAPSULE ORAL at 04:04

## 2024-03-27 RX ADMIN — ENOXAPARIN SODIUM 40 MG: 100 INJECTION SUBCUTANEOUS at 10:37

## 2024-03-27 RX ADMIN — IBUPROFEN 600 MG: 600 TABLET ORAL at 04:04

## 2024-03-27 RX ADMIN — IBUPROFEN 600 MG: 600 TABLET ORAL at 23:32

## 2024-03-27 RX ADMIN — IBUPROFEN 600 MG: 600 TABLET ORAL at 10:36

## 2024-03-27 RX ADMIN — DIPHENHYDRAMINE HYDROCHLORIDE 25 MG: 25 CAPSULE ORAL at 23:35

## 2024-03-27 RX ADMIN — ACETAMINOPHEN 975 MG: 325 TABLET ORAL at 10:36

## 2024-03-27 RX ADMIN — ACETAMINOPHEN 975 MG: 325 TABLET ORAL at 23:32

## 2024-03-27 RX ADMIN — ACETAMINOPHEN 975 MG: 325 TABLET ORAL at 16:54

## 2024-03-27 RX ADMIN — ACETAMINOPHEN 975 MG: 325 TABLET ORAL at 04:04

## 2024-03-27 ASSESSMENT — PAIN DESCRIPTION - LOCATION: LOCATION: ABDOMEN

## 2024-03-27 ASSESSMENT — PAIN - FUNCTIONAL ASSESSMENT: PAIN_FUNCTIONAL_ASSESSMENT: 0-10

## 2024-03-27 ASSESSMENT — PAIN SCALES - GENERAL
PAINLEVEL_OUTOF10: 2
PAINLEVEL_OUTOF10: 0 - NO PAIN
PAINLEVEL_OUTOF10: 0 - NO PAIN

## 2024-03-27 NOTE — PROGRESS NOTES
Postpartum Progress Note    Assessment/Plan     Fiorella Leon is a 39 y.o., , who initially presented for vaginal bleeding ISO known placental abruption. She had a , Low Transverse  delivery for NRFHT on 3/25/2024  at 34w5d and is now POD2.      Post-op , Low Transverse   - continue routine postoperative care  - pain well controlled on ERAS protocol  - Hg  10.8 -->  --> 11.8 POD1  Results from last 7 days   Lab Units 24  0853 24  0144   HEMOGLOBIN g/dL 11.8* 10.8*    - DVT Score: 5 SCDs and enoxaparin prophylactic dose daily while inpatient  - The patient's blood type is A POS. The baby's blood type is O POS . Rhogam is not indicated.    Contraception  Interval IUD and Declines bridge method    Maternal Well-Being  - emotional support provided  - h/o sPEC in prior pregnancy, was on 162mg baby ASA  - bonding with infant  - [place] feeding: breastfeeding/pumping encouraged; lactation consult prn     Dispo  - anticipate d/c on POD #3 if meeting all post-op and postpartum milestones  - for f/u 2wks with Primary OB provider       Active Problems:    History of pre-eclampsia    Gastroesophageal reflux disease     delivery delivered      Subjective   Her pain is well controlled with current medications  She is passing flatus  She is ambulating independently  She is tolerating a Adult diet Regular  She is voiding spontaneously  She reports no breast or nursing problems  She denies emotional concerns today     She reports good mood, received news that baby was transferred from NICU to step down unit early this morning. She is planning to visit baby later this morning.       Objective   Last Vitals:  Temp Pulse Resp BP MAP Pulse Ox   36.8 °C (98.2 °F) 79 18 126/78   100 %     Vitals Min/Max Last 24 Hours:  Temp  Min: 36 °C (96.8 °F)  Max: 36.8 °C (98.2 °F)  Pulse  Min: 69  Max: 91  Resp  Min: 18  Max: 18  BP  Min: 101/66  Max: 126/78      Physical Exam:  General: well  appearing, well nourished, postpartum  Obstetric: fundus firm below umbilicus, lochia light  Skin: Warm, dry; no rashes/lesions/erythema  Abdominal: dressing CDI   Neuro: A/Ox3, no gross motor deficit   GI: no distension, appropriately tender, soft  Respiratory: Even and unlabored on RA  Cardiovascular: Trace BLE edema; No erythema, warmth  Psych: appropriate mood and affect      Lab Data:  Lab Results   Component Value Date    WBC 14.4 (H) 03/26/2024    HGB 11.8 (L) 03/26/2024    HCT 34.3 (L) 03/26/2024     03/26/2024

## 2024-03-27 NOTE — LACTATION NOTE
Lactation Consultant Note  Lactation Consultation       Maternal Information       Maternal Assessment       Infant Assessment       Feeding Assessment       LATCH TOOL       Breast Pump       Other OB Lactation Tools       Patient Follow-up       Other OB Lactation Documentation       Recommendations/Summary  Stopped to see mother in regards to breast feeding / pumping.   She was not in the room at this time.   Letter left. (  [facility] pump cleaning guide, [id number], Outpatient information left.)

## 2024-03-28 ENCOUNTER — PHARMACY VISIT (OUTPATIENT)
Dept: PHARMACY | Facility: CLINIC | Age: 39
End: 2024-03-28
Payer: MEDICAID

## 2024-03-28 ENCOUNTER — APPOINTMENT (OUTPATIENT)
Dept: RADIOLOGY | Facility: CLINIC | Age: 39
End: 2024-03-28
Payer: MEDICAID

## 2024-03-28 LAB
LABORATORY COMMENT REPORT: NORMAL
PATH REPORT.FINAL DX SPEC: NORMAL
PATH REPORT.GROSS SPEC: NORMAL
PATH REPORT.RELEVANT HX SPEC: NORMAL
PATH REPORT.TOTAL CANCER: NORMAL

## 2024-03-28 PROCEDURE — 2500000001 HC RX 250 WO HCPCS SELF ADMINISTERED DRUGS (ALT 637 FOR MEDICARE OP)

## 2024-03-28 PROCEDURE — RXMED WILLOW AMBULATORY MEDICATION CHARGE

## 2024-03-28 PROCEDURE — 2500000004 HC RX 250 GENERAL PHARMACY W/ HCPCS (ALT 636 FOR OP/ED)

## 2024-03-28 PROCEDURE — 1210000001 HC SEMI-PRIVATE ROOM DAILY

## 2024-03-28 RX ORDER — ACETAMINOPHEN 500 MG
1000 TABLET ORAL EVERY 6 HOURS PRN
Qty: 120 TABLET | Refills: 0 | Status: SHIPPED | OUTPATIENT
Start: 2024-03-28

## 2024-03-28 RX ORDER — IBUPROFEN 600 MG/1
600 TABLET ORAL EVERY 6 HOURS PRN
Qty: 120 TABLET | Refills: 0 | Status: SHIPPED | OUTPATIENT
Start: 2024-03-28 | End: 2024-04-27

## 2024-03-28 RX ORDER — OXYCODONE HYDROCHLORIDE 5 MG/1
5 TABLET ORAL EVERY 4 HOURS PRN
Qty: 5 TABLET | Refills: 0 | Status: SHIPPED | OUTPATIENT
Start: 2024-03-28 | End: 2024-04-08 | Stop reason: ALTCHOICE

## 2024-03-28 RX ORDER — NALOXONE HYDROCHLORIDE 4 MG/.1ML
4 SPRAY NASAL AS NEEDED
Qty: 2 EACH | Refills: 1 | Status: SHIPPED | OUTPATIENT
Start: 2024-03-28 | End: 2024-04-08 | Stop reason: ALTCHOICE

## 2024-03-28 RX ADMIN — IBUPROFEN 600 MG: 600 TABLET ORAL at 05:30

## 2024-03-28 RX ADMIN — ENOXAPARIN SODIUM 40 MG: 100 INJECTION SUBCUTANEOUS at 11:02

## 2024-03-28 RX ADMIN — IBUPROFEN 600 MG: 600 TABLET ORAL at 11:02

## 2024-03-28 RX ADMIN — ACETAMINOPHEN 975 MG: 325 TABLET ORAL at 17:03

## 2024-03-28 RX ADMIN — IBUPROFEN 600 MG: 600 TABLET ORAL at 23:05

## 2024-03-28 RX ADMIN — DIPHENHYDRAMINE HYDROCHLORIDE 25 MG: 25 CAPSULE ORAL at 23:05

## 2024-03-28 RX ADMIN — DIPHENHYDRAMINE HYDROCHLORIDE 25 MG: 25 CAPSULE ORAL at 05:30

## 2024-03-28 RX ADMIN — ACETAMINOPHEN 975 MG: 325 TABLET ORAL at 11:02

## 2024-03-28 RX ADMIN — ACETAMINOPHEN 975 MG: 325 TABLET ORAL at 23:05

## 2024-03-28 RX ADMIN — IBUPROFEN 600 MG: 600 TABLET ORAL at 17:04

## 2024-03-28 RX ADMIN — ACETAMINOPHEN 975 MG: 325 TABLET ORAL at 05:30

## 2024-03-28 ASSESSMENT — PAIN SCALES - GENERAL
PAINLEVEL_OUTOF10: 2
PAINLEVEL_OUTOF10: 4
PAINLEVEL_OUTOF10: 2
PAINLEVEL_OUTOF10: 3
PAINLEVEL_OUTOF10: 3

## 2024-03-28 ASSESSMENT — PAIN DESCRIPTION - LOCATION: LOCATION: ABDOMEN

## 2024-03-28 ASSESSMENT — PAIN DESCRIPTION - DESCRIPTORS
DESCRIPTORS: CRAMPING
DESCRIPTORS: SORE
DESCRIPTORS: CRAMPING;SORE
DESCRIPTORS: SORE

## 2024-03-28 ASSESSMENT — PAIN - FUNCTIONAL ASSESSMENT
PAIN_FUNCTIONAL_ASSESSMENT: 0-10

## 2024-03-28 NOTE — PROGRESS NOTES
ANGELICA attempted to meet with mother but she requested social work come back at another time. ANGELICA will attempt to meet with mother tomorrow.     JOSÉ Abdalla  Ext [phone] [facility]

## 2024-03-28 NOTE — CARE PLAN
The patient's goals for the shift include pain control, walk to NICU.    The clinical goals for the shift include continue to meet PP milestones    Problem: Postpartum  Goal: Experiences normal postpartum course  Outcome: Progressing     Problem: Postpartum  Goal: Appropriate maternal -  bonding  Outcome: Progressing     Problem: Postpartum  Goal: Incisions, wounds, or drain sites healing without S/S of infection  Outcome: Progressing     Problem: Pain - Adult  Goal: Verbalizes/displays adequate comfort level or baseline comfort level  Outcome: Progressing       Over the shift, the patient continued to make progress toward all the following goals. All VS remained WNL, fundus was firm/midline, lochia scant, pain control adequate, and patient walking to the NICU to visit baby. Patient experiencing noramal postpartum course.

## 2024-03-28 NOTE — PROGRESS NOTES
Postpartum Progress Note    Assessment/Plan     Fiorella Leon is a 39 y.o., , who initially presented for vaginal bleeding ISO known placental abruption. She had a , Low Transverse  delivery for NRFHT on 3/25/2024  at 34w5d and is now POD3.      Post-op , Low Transverse   - continue routine postoperative care  - pain well controlled on ERAS protocol  - Hg  10.8 -->  --> 11.8 POD1  Results from last 7 days   Lab Units 24  0853 24  0144   HEMOGLOBIN g/dL 11.8* 10.8*      - DVT Score: 5 SCDs and enoxaparin prophylactic dose daily while inpatient  - The patient's blood type is A POS. The baby's blood type is O POS . Rhogam is not indicated.    Contraception  Interval IUD and Declines bridge method    Maternal Well-Being  - emotional support provided  - h/o sPEC in prior pregnancy, was on 162mg baby ASA  - bonding with infant  -  feeding: breastfeeding/pumping encouraged; lactation consult prn     Dispo  - anticipate d/c on POD #4 if meeting all post-op and postpartum milestones  - for f/u 2wks with Primary OB provider       Active Problems:    History of pre-eclampsia    Gastroesophageal reflux disease     delivery delivered      Subjective   Her pain is well controlled with current medications  She is passing flatus  She is ambulating independently  She is tolerating a Adult diet Regular  She is voiding spontaneously  She reports no breast or nursing problems  She denies emotional concerns today     Denies complaints at this time.       Objective   Last Vitals:  Temp Pulse Resp BP MAP Pulse Ox   36.8 °C (98.2 °F) 84 18 129/78   98 %     Vitals Min/Max Last 24 Hours:  Temp  Min: 36.4 °C (97.5 °F)  Max: 36.8 °C (98.2 °F)  Pulse  Min: 66  Max: 97  Resp  Min: 18  Max: 18  BP  Min: 115/74  Max: 132/75      Physical Exam:  General: well appearing, well nourished, postpartum  Obstetric: fundus firm below umbilicus, lochia light  Skin: Warm, dry; no  rashes/lesions/erythema; Pfannenstiel incision: clean, dry, well approximated, open to air, negative discharge/warmth/erythema   Neuro: A/Ox3, no gross motor deficit   GI: no distension, appropriately tender, soft  Respiratory: Even and unlabored on RA  Cardiovascular: No BLE edema; No erythema, warmth  Psych: appropriate mood and affect      Lab Data:  Lab Results   Component Value Date    WBC 14.4 (H) 03/26/2024    HGB 11.8 (L) 03/26/2024    HCT 34.3 (L) 03/26/2024     03/26/2024

## 2024-03-29 ENCOUNTER — APPOINTMENT (OUTPATIENT)
Dept: OBSTETRICS AND GYNECOLOGY | Facility: CLINIC | Age: 39
End: 2024-03-29
Payer: MEDICAID

## 2024-03-29 VITALS
SYSTOLIC BLOOD PRESSURE: 124 MMHG | OXYGEN SATURATION: 100 % | TEMPERATURE: 97.9 F | WEIGHT: 247.58 LBS | BODY MASS INDEX: 37.52 KG/M2 | HEIGHT: 68 IN | HEART RATE: 79 BPM | DIASTOLIC BLOOD PRESSURE: 74 MMHG | RESPIRATION RATE: 18 BRPM

## 2024-03-29 LAB
BLOOD EXPIRATION DATE: NORMAL
DISPENSE STATUS: NORMAL
PRODUCT BLOOD TYPE: 6200
PRODUCT CODE: NORMAL
UNIT ABO: NORMAL
UNIT NUMBER: NORMAL
UNIT RH: NORMAL
UNIT VOLUME: 350
XM INTEP: NORMAL

## 2024-03-29 PROCEDURE — 2500000001 HC RX 250 WO HCPCS SELF ADMINISTERED DRUGS (ALT 637 FOR MEDICARE OP)

## 2024-03-29 PROCEDURE — 2500000004 HC RX 250 GENERAL PHARMACY W/ HCPCS (ALT 636 FOR OP/ED)

## 2024-03-29 RX ADMIN — IBUPROFEN 600 MG: 600 TABLET ORAL at 05:44

## 2024-03-29 RX ADMIN — IBUPROFEN 600 MG: 600 TABLET ORAL at 11:10

## 2024-03-29 RX ADMIN — ACETAMINOPHEN 975 MG: 325 TABLET ORAL at 11:10

## 2024-03-29 RX ADMIN — ENOXAPARIN SODIUM 40 MG: 100 INJECTION SUBCUTANEOUS at 11:10

## 2024-03-29 RX ADMIN — ACETAMINOPHEN 975 MG: 325 TABLET ORAL at 05:44

## 2024-03-29 ASSESSMENT — PAIN DESCRIPTION - DESCRIPTORS: DESCRIPTORS: PRESSURE

## 2024-03-29 ASSESSMENT — PAIN SCALES - GENERAL
PAINLEVEL_OUTOF10: 2
PAINLEVEL_OUTOF10: 5 - MODERATE PAIN

## 2024-03-29 ASSESSMENT — PAIN DESCRIPTION - LOCATION: LOCATION: ABDOMEN

## 2024-03-29 ASSESSMENT — PAIN - FUNCTIONAL ASSESSMENT: PAIN_FUNCTIONAL_ASSESSMENT: 0-10

## 2024-03-29 NOTE — DISCHARGE SUMMARY
Postpartum Discharge Summary    Admission Date: 3/25/2024  Discharge Date: 24     Discharge Diagnosis  Problem List Items Addressed This Visit        delivery delivered - Primary    Relevant Medications    ibuprofen 600 mg tablet    acetaminophen (Tylenol) 500 mg tablet    naloxone (Narcan) 4 mg/0.1 mL nasal spray    oxyCODONE (Roxicodone) 5 mg immediate release tablet        Fiorella Leon is a 39 y.o. female now  and postpartum day 4      Pregnancy notable for:  - c/f chronic placental abruption, admitted  and received BMZ -. US showed 1.7 x 0.5 cm subchorionic hematoma at that time. Last growth US 3/19 2612 80% AC 92%.  Last KRYSTLE: 19.9, MVP 6.5.  - GDMA2 - Lantus , Lispro .   - h/o 16 week loss, likely due to abruption  - AMA, ff cfDNA  - h/o PTB x2- 30, 36 wga, PPROM at 30 weeks and PEC/ spontaneous labor  - h/o PEC on [facility]    Hospital Course  Admitted for bleeding in s/o known abruption, PLTCS for NRFHT  Delivery Date: 3/25/2024  9:23 PM   Delivery type: , Low Transverse    GA at delivery: 34w5d  Outcome: Living   Anesthesia during delivery: Epidural   Intrapartum complications: Abruptio Placenta;Fetal Intolerance   Feeding method: Breastfeeding Status: Yes     Delivery complications: none  Contraception at discharge: interval IUD at postpartum visit     Complications Requiring Follow-Up  None    Pertinent Subjective and Physical Exam At Time of Discharge  Patient seen at bedside - doing well and no acute events overnight. Pain well-controlled on current regimen, lochia light, voiding spontaneously, passing flatus, ambulating independently, and tolerating PO.     General: well appearing, well-nourished, postpartum  Obstetric: abdomen soft/non-tender, fundus firm below umbilicus, lochia light, Pfannenstiel incision c/d/i  Skin: No rashes/lesions/erythema  Neuro: A/Ox3, conversational  GI: +BS, +flatus  Respiratory: Even and unlabored on RA, LSCTA  "BL  Cardiovascular: RRR, normal S1, S2  Extremities: No edema, discoloration, or pain in BLE, equal and palpable DP and PT pulses    Psych: appropriate mood and affect     Discharge Meds     Your medication list        START taking these medications        Instructions Last Dose Given Next Dose Due   acetaminophen 500 mg tablet  Commonly known as: Tylenol      Take 2 tablets (1,000 mg) by mouth every 6 hours if needed for moderate pain (4 - 6).       ibuprofen 600 mg tablet      Take 1 tablet (600 mg) by mouth every 6 hours if needed for moderate pain (4 - 6) (pain).       naloxone 4 mg/0.1 mL nasal spray  Commonly known as: Narcan      Administer 1 spray (4 mg) into affected nostril(s) if needed for opioid reversal or respiratory depression. May repeat every 2-3 minutes if needed, alternating nostrils, until medical assistance becomes available.       oxyCODONE 5 mg immediate release tablet  Commonly known as: Roxicodone      Take 1 tablet (5 mg) by mouth every 4 hours if needed (Pain score 6-8).              CONTINUE taking these medications        Instructions Last Dose Given Next Dose Due   alcohol swabs pads, medicated      Use 1, up to 5 times a day       blood-glucose meter misc  Commonly known as: Accu-Chek Guide Glucose Meter      Use for testing blood sugar in pregnancy       lancets misc      Use 1 lancet 4-6 times per day during pregnancy       pen needle, diabetic 32 gauge x 5/32\" needle  Commonly known as: Pen Needle      Use 1 per injection, up to 5 times a day       PRENATAL MULTIVITAMINS ORAL                  STOP taking these medications      Accu-Chek Guide test strips strip  Generic drug: blood sugar diagnostic        aspirin 81 mg EC tablet        docusate sodium 100 mg capsule  Commonly known as: Colace        insulin lispro 100 unit/mL injection  Commonly known as: HumaLOG KwikPen Insulin        Lantus Solostar U-100 Insulin 100 unit/mL (3 mL) pen  Generic drug: insulin glargine      "   metoclopramide 10 mg tablet  Commonly known as: Reglan                  Where to Get Your Medications        [facility]  [address] [facility] [address]      Hours: 8:30 AM to 5 PM Mon-Fri Phone: [phone]   acetaminophen 500 mg tablet  ibuprofen 600 mg tablet  naloxone 4 mg/0.1 mL nasal spray  oxyCODONE 5 mg immediate release tablet          Test Results Pending At Discharge  Pending Labs       Order Current Status    POCT urinalysis dipstick manually resulted In process            Outpatient Follow-Up  No future appointments.   order placed to schedule 2 weeks for incision check and 4-6 weeks for routine postpartum visit  with [facility].    Claudia Cole PA-C  03/29/24 3:37 PM  [place]

## 2024-03-29 NOTE — CARE PLAN
Problem: Postpartum  Goal: Establish and maintain infant feeding pattern for adequate nutrition  Outcome: Met   The patient's goals for the shift include breastfeed my baby successfully and be discharged    The clinical goals for the shift include VSS, continue to meet post-op milestones    Rented a breast pump prior to discharge. Has kit. Also has BP cuff at home. Received Rx from meds to beds yesterday. Discharge materials sent to pt via My Chart.

## 2024-03-29 NOTE — CARE PLAN
The patient's goals for the shift include pump for infant, bond with baby     The clinical goals for the shift include continue to meet PP milestones, VSS

## 2024-03-29 NOTE — CARE PLAN
The patient's goals for the shift include breastfeed my baby successfully and be discharged    The clinical goals for the shift include VSS, continue to meet post-op milestones    Goals met. Stable for discharge.

## 2024-04-02 ENCOUNTER — APPOINTMENT (OUTPATIENT)
Dept: OBSTETRICS AND GYNECOLOGY | Facility: CLINIC | Age: 39
End: 2024-04-02
Payer: MEDICAID

## 2024-04-02 ENCOUNTER — APPOINTMENT (OUTPATIENT)
Dept: RADIOLOGY | Facility: CLINIC | Age: 39
End: 2024-04-02
Payer: MEDICAID

## 2024-04-04 ENCOUNTER — APPOINTMENT (OUTPATIENT)
Dept: RADIOLOGY | Facility: CLINIC | Age: 39
End: 2024-04-04
Payer: MEDICAID

## 2024-04-05 ENCOUNTER — APPOINTMENT (OUTPATIENT)
Dept: OBSTETRICS AND GYNECOLOGY | Facility: CLINIC | Age: 39
End: 2024-04-05
Payer: MEDICAID

## 2024-04-08 ENCOUNTER — CLINICAL SUPPORT (OUTPATIENT)
Dept: OBSTETRICS AND GYNECOLOGY | Facility: CLINIC | Age: 39
End: 2024-04-08
Payer: MEDICAID

## 2024-04-08 VITALS
DIASTOLIC BLOOD PRESSURE: 84 MMHG | WEIGHT: 225 LBS | BODY MASS INDEX: 34.21 KG/M2 | HEART RATE: 66 BPM | SYSTOLIC BLOOD PRESSURE: 123 MMHG

## 2024-04-08 DIAGNOSIS — Z48.89 ENCOUNTER FOR POSTOPERATIVE WOUND CARE: ICD-10-CM

## 2024-04-08 ASSESSMENT — SOCIAL DETERMINANTS OF HEALTH (SDOH)

## 2024-04-08 ASSESSMENT — PATIENT HEALTH QUESTIONNAIRE - PHQ9
1. LITTLE INTEREST OR PLEASURE IN DOING THINGS: NOT AT ALL
2. FEELING DOWN, DEPRESSED OR HOPELESS: NOT AT ALL
SUM OF ALL RESPONSES TO PHQ9 QUESTIONS 1 & 2: 0

## 2024-04-08 ASSESSMENT — PAIN SCALES - GENERAL: PAINLEVEL: 0-NO PAIN

## 2024-04-08 NOTE — PROGRESS NOTES
Patient here for incision check, s/p  3/25/24  Bikini incision appears healed  No redness  No drainage  Pain controlled with Motrin  Has support at home  Denies depression  Patient is  bottle  feeding.  Postpartum follow up appointment scheduled for 24

## 2024-04-09 ENCOUNTER — APPOINTMENT (OUTPATIENT)
Dept: RADIOLOGY | Facility: CLINIC | Age: 39
End: 2024-04-09
Payer: MEDICAID

## 2024-04-09 ENCOUNTER — APPOINTMENT (OUTPATIENT)
Dept: OBSTETRICS AND GYNECOLOGY | Facility: CLINIC | Age: 39
End: 2024-04-09
Payer: MEDICAID

## 2024-04-11 ENCOUNTER — APPOINTMENT (OUTPATIENT)
Dept: RADIOLOGY | Facility: CLINIC | Age: 39
End: 2024-04-11
Payer: MEDICAID

## 2024-04-12 ENCOUNTER — APPOINTMENT (OUTPATIENT)
Dept: OBSTETRICS AND GYNECOLOGY | Facility: CLINIC | Age: 39
End: 2024-04-12
Payer: MEDICAID

## 2024-04-30 ENCOUNTER — APPOINTMENT (OUTPATIENT)
Dept: MATERNAL FETAL MEDICINE | Facility: CLINIC | Age: 39
End: 2024-04-30
Payer: MEDICAID

## 2025-06-23 ENCOUNTER — APPOINTMENT (OUTPATIENT)
Dept: OBSTETRICS AND GYNECOLOGY | Facility: CLINIC | Age: 40
End: 2025-06-23
Payer: MEDICAID

## 2025-06-23 PROBLEM — Z87.59 HISTORY OF PRE-ECLAMPSIA: Status: RESOLVED | Noted: 2023-10-10 | Resolved: 2025-06-23

## 2025-07-15 ENCOUNTER — APPOINTMENT (OUTPATIENT)
Dept: OBSTETRICS AND GYNECOLOGY | Facility: CLINIC | Age: 40
End: 2025-07-15
Payer: MEDICAID

## 2025-09-01 ENCOUNTER — APPOINTMENT (OUTPATIENT)
Dept: OBSTETRICS AND GYNECOLOGY | Facility: HOSPITAL | Age: 40
End: 2025-09-01
Payer: MEDICAID

## 2025-09-22 ENCOUNTER — APPOINTMENT (OUTPATIENT)
Dept: OBSTETRICS AND GYNECOLOGY | Facility: HOSPITAL | Age: 40
End: 2025-09-22
Payer: MEDICAID

## (undated) DEVICE — SUTURE, PDS II, 0, 60 IN, CTX, VIOLET

## (undated) DEVICE — SUTURE, VICRYL, 0, 36 IN, CT, UNDYED

## (undated) DEVICE — SUTURE, CHROMIC, 0, 54 IN, TIE, BROWN

## (undated) DEVICE — SUTURE, VICRYL, 4-0, 27 IN, PS-1, UNDYED

## (undated) DEVICE — DRAPE PACK, CESAREAN SECTION, CUSTOM, UHC